# Patient Record
Sex: FEMALE | Race: WHITE | NOT HISPANIC OR LATINO | Employment: OTHER | URBAN - METROPOLITAN AREA
[De-identification: names, ages, dates, MRNs, and addresses within clinical notes are randomized per-mention and may not be internally consistent; named-entity substitution may affect disease eponyms.]

---

## 2024-08-14 ENCOUNTER — HOSPITAL ENCOUNTER (INPATIENT)
Facility: HOSPITAL | Age: 89
DRG: 177 | End: 2024-08-14
Attending: EMERGENCY MEDICINE | Admitting: HOSPITALIST
Payer: MEDICARE

## 2024-08-14 ENCOUNTER — APPOINTMENT (OUTPATIENT)
Dept: CARDIOLOGY | Facility: HOSPITAL | Age: 89
DRG: 871 | End: 2024-08-14
Payer: MEDICARE

## 2024-08-14 ENCOUNTER — APPOINTMENT (OUTPATIENT)
Dept: RADIOLOGY | Facility: HOSPITAL | Age: 89
DRG: 871 | End: 2024-08-14
Payer: MEDICARE

## 2024-08-14 DIAGNOSIS — S22.070A CLOSED WEDGE COMPRESSION FRACTURE OF T10 VERTEBRA, INITIAL ENCOUNTER (MULTI): ICD-10-CM

## 2024-08-14 DIAGNOSIS — A41.9 SEPSIS, DUE TO UNSPECIFIED ORGANISM, UNSPECIFIED WHETHER ACUTE ORGAN DYSFUNCTION PRESENT (MULTI): ICD-10-CM

## 2024-08-14 DIAGNOSIS — R09.02 HYPOXIA: ICD-10-CM

## 2024-08-14 DIAGNOSIS — J18.9 PNEUMONIA OF BOTH LUNGS DUE TO INFECTIOUS ORGANISM, UNSPECIFIED PART OF LUNG: ICD-10-CM

## 2024-08-14 DIAGNOSIS — S09.90XA HEAD INJURY, INITIAL ENCOUNTER: Primary | ICD-10-CM

## 2024-08-14 LAB
ALBUMIN SERPL BCP-MCNC: 3.4 G/DL (ref 3.4–5)
ALP SERPL-CCNC: 47 U/L (ref 33–136)
ALT SERPL W P-5'-P-CCNC: 10 U/L (ref 7–45)
ANION GAP SERPL CALC-SCNC: 12 MMOL/L (ref 10–20)
APTT PPP: 32 SECONDS (ref 27–38)
AST SERPL W P-5'-P-CCNC: 26 U/L (ref 9–39)
BASOPHILS # BLD AUTO: 0.02 X10*3/UL (ref 0–0.1)
BASOPHILS NFR BLD AUTO: 0.1 %
BILIRUB SERPL-MCNC: 0.6 MG/DL (ref 0–1.2)
BUN SERPL-MCNC: 23 MG/DL (ref 6–23)
CALCIUM SERPL-MCNC: 8.8 MG/DL (ref 8.6–10.3)
CARDIAC TROPONIN I PNL SERPL HS: 10 NG/L (ref 0–13)
CHLORIDE SERPL-SCNC: 103 MMOL/L (ref 98–107)
CO2 SERPL-SCNC: 25 MMOL/L (ref 21–32)
CREAT SERPL-MCNC: 0.91 MG/DL (ref 0.5–1.05)
EGFRCR SERPLBLD CKD-EPI 2021: 58 ML/MIN/1.73M*2
EOSINOPHIL # BLD AUTO: 0 X10*3/UL (ref 0–0.4)
EOSINOPHIL NFR BLD AUTO: 0 %
ERYTHROCYTE [DISTWIDTH] IN BLOOD BY AUTOMATED COUNT: 13.9 % (ref 11.5–14.5)
GLUCOSE SERPL-MCNC: 143 MG/DL (ref 74–99)
HCT VFR BLD AUTO: 33.2 % (ref 36–46)
HGB BLD-MCNC: 10.9 G/DL (ref 12–16)
IMM GRANULOCYTES # BLD AUTO: 0.65 X10*3/UL (ref 0–0.5)
IMM GRANULOCYTES NFR BLD AUTO: 3.1 % (ref 0–0.9)
INR PPP: 1.1 (ref 0.9–1.1)
LYMPHOCYTES # BLD AUTO: 1.02 X10*3/UL (ref 0.8–3)
LYMPHOCYTES NFR BLD AUTO: 4.8 %
MAGNESIUM SERPL-MCNC: 1.5 MG/DL (ref 1.6–2.4)
MCH RBC QN AUTO: 31.8 PG (ref 26–34)
MCHC RBC AUTO-ENTMCNC: 32.8 G/DL (ref 32–36)
MCV RBC AUTO: 97 FL (ref 80–100)
MONOCYTES # BLD AUTO: 1.08 X10*3/UL (ref 0.05–0.8)
MONOCYTES NFR BLD AUTO: 5.1 %
NEUTROPHILS # BLD AUTO: 18.33 X10*3/UL (ref 1.6–5.5)
NEUTROPHILS NFR BLD AUTO: 86.9 %
NRBC BLD-RTO: 0 /100 WBCS (ref 0–0)
PLATELET # BLD AUTO: 241 X10*3/UL (ref 150–450)
POTASSIUM SERPL-SCNC: 5.2 MMOL/L (ref 3.5–5.3)
PROT SERPL-MCNC: 6.8 G/DL (ref 6.4–8.2)
PROTHROMBIN TIME: 12.9 SECONDS (ref 9.8–12.8)
RBC # BLD AUTO: 3.43 X10*6/UL (ref 4–5.2)
SARS-COV-2 RNA RESP QL NAA+PROBE: NOT DETECTED
SODIUM SERPL-SCNC: 135 MMOL/L (ref 136–145)
WBC # BLD AUTO: 21.1 X10*3/UL (ref 4.4–11.3)

## 2024-08-14 PROCEDURE — 70450 CT HEAD/BRAIN W/O DYE: CPT | Performed by: RADIOLOGY

## 2024-08-14 PROCEDURE — 84484 ASSAY OF TROPONIN QUANT: CPT | Performed by: EMERGENCY MEDICINE

## 2024-08-14 PROCEDURE — 36415 COLL VENOUS BLD VENIPUNCTURE: CPT | Performed by: EMERGENCY MEDICINE

## 2024-08-14 PROCEDURE — 83880 ASSAY OF NATRIURETIC PEPTIDE: CPT | Performed by: EMERGENCY MEDICINE

## 2024-08-14 PROCEDURE — 80053 COMPREHEN METABOLIC PANEL: CPT | Performed by: EMERGENCY MEDICINE

## 2024-08-14 PROCEDURE — 85610 PROTHROMBIN TIME: CPT | Performed by: EMERGENCY MEDICINE

## 2024-08-14 PROCEDURE — 71250 CT THORAX DX C-: CPT | Performed by: RADIOLOGY

## 2024-08-14 PROCEDURE — 72128 CT CHEST SPINE W/O DYE: CPT | Performed by: RADIOLOGY

## 2024-08-14 PROCEDURE — 73502 X-RAY EXAM HIP UNI 2-3 VIEWS: CPT | Mod: LT

## 2024-08-14 PROCEDURE — 72128 CT CHEST SPINE W/O DYE: CPT | Mod: RCN

## 2024-08-14 PROCEDURE — 71045 X-RAY EXAM CHEST 1 VIEW: CPT | Performed by: RADIOLOGY

## 2024-08-14 PROCEDURE — 96367 TX/PROPH/DG ADDL SEQ IV INF: CPT

## 2024-08-14 PROCEDURE — 85025 COMPLETE CBC W/AUTO DIFF WBC: CPT | Performed by: EMERGENCY MEDICINE

## 2024-08-14 PROCEDURE — 70450 CT HEAD/BRAIN W/O DYE: CPT

## 2024-08-14 PROCEDURE — 85730 THROMBOPLASTIN TIME PARTIAL: CPT | Performed by: EMERGENCY MEDICINE

## 2024-08-14 PROCEDURE — 74176 CT ABD & PELVIS W/O CONTRAST: CPT

## 2024-08-14 PROCEDURE — 83735 ASSAY OF MAGNESIUM: CPT | Performed by: EMERGENCY MEDICINE

## 2024-08-14 PROCEDURE — 93005 ELECTROCARDIOGRAM TRACING: CPT

## 2024-08-14 PROCEDURE — 72131 CT LUMBAR SPINE W/O DYE: CPT | Performed by: RADIOLOGY

## 2024-08-14 PROCEDURE — 87635 SARS-COV-2 COVID-19 AMP PRB: CPT | Performed by: EMERGENCY MEDICINE

## 2024-08-14 PROCEDURE — 99285 EMERGENCY DEPT VISIT HI MDM: CPT | Mod: CS

## 2024-08-14 PROCEDURE — 74176 CT ABD & PELVIS W/O CONTRAST: CPT | Performed by: RADIOLOGY

## 2024-08-14 PROCEDURE — 72125 CT NECK SPINE W/O DYE: CPT | Performed by: RADIOLOGY

## 2024-08-14 PROCEDURE — 73502 X-RAY EXAM HIP UNI 2-3 VIEWS: CPT | Mod: LEFT SIDE | Performed by: RADIOLOGY

## 2024-08-14 PROCEDURE — 72125 CT NECK SPINE W/O DYE: CPT

## 2024-08-14 PROCEDURE — 72131 CT LUMBAR SPINE W/O DYE: CPT | Mod: RCN

## 2024-08-14 PROCEDURE — 71045 X-RAY EXAM CHEST 1 VIEW: CPT

## 2024-08-14 ASSESSMENT — PAIN SCALES - GENERAL: PAINLEVEL_OUTOF10: 8

## 2024-08-14 ASSESSMENT — COLUMBIA-SUICIDE SEVERITY RATING SCALE - C-SSRS
2. HAVE YOU ACTUALLY HAD ANY THOUGHTS OF KILLING YOURSELF?: NO
1. IN THE PAST MONTH, HAVE YOU WISHED YOU WERE DEAD OR WISHED YOU COULD GO TO SLEEP AND NOT WAKE UP?: NO
6. HAVE YOU EVER DONE ANYTHING, STARTED TO DO ANYTHING, OR PREPARED TO DO ANYTHING TO END YOUR LIFE?: NO

## 2024-08-14 ASSESSMENT — PAIN DESCRIPTION - PAIN TYPE: TYPE: ACUTE PAIN

## 2024-08-14 ASSESSMENT — PAIN DESCRIPTION - LOCATION: LOCATION: RIB CAGE

## 2024-08-14 ASSESSMENT — PAIN - FUNCTIONAL ASSESSMENT: PAIN_FUNCTIONAL_ASSESSMENT: 0-10

## 2024-08-15 ENCOUNTER — APPOINTMENT (OUTPATIENT)
Dept: CARDIOLOGY | Facility: HOSPITAL | Age: 89
DRG: 871 | End: 2024-08-15
Payer: MEDICARE

## 2024-08-15 PROBLEM — S09.90XA HEAD INJURY, INITIAL ENCOUNTER: Status: ACTIVE | Noted: 2024-08-15

## 2024-08-15 LAB
APPEARANCE UR: CLEAR
ATRIAL RATE: 70 BPM
BILIRUB UR STRIP.AUTO-MCNC: NEGATIVE MG/DL
BNP SERPL-MCNC: 128 PG/ML (ref 0–99)
CARDIAC TROPONIN I PNL SERPL HS: 10 NG/L (ref 0–13)
COLOR UR: ABNORMAL
GLUCOSE UR STRIP.AUTO-MCNC: NORMAL MG/DL
HOLD SPECIMEN: NORMAL
KETONES UR STRIP.AUTO-MCNC: NEGATIVE MG/DL
LEUKOCYTE ESTERASE UR QL STRIP.AUTO: ABNORMAL
MUCOUS THREADS #/AREA URNS AUTO: NORMAL /LPF
NITRITE UR QL STRIP.AUTO: NEGATIVE
P AXIS: 43 DEGREES
P OFFSET: 180 MS
P ONSET: 145 MS
PH UR STRIP.AUTO: 5.5 [PH]
PR INTERVAL: 154 MS
PROT UR STRIP.AUTO-MCNC: ABNORMAL MG/DL
Q ONSET: 222 MS
QRS COUNT: 11 BEATS
QRS DURATION: 74 MS
QT INTERVAL: 392 MS
QTC CALCULATION(BAZETT): 423 MS
QTC FREDERICIA: 412 MS
R AXIS: 58 DEGREES
RBC # UR STRIP.AUTO: NEGATIVE /UL
RBC #/AREA URNS AUTO: NORMAL /HPF
SP GR UR STRIP.AUTO: 1.03
T AXIS: 60 DEGREES
T OFFSET: 418 MS
UROBILINOGEN UR STRIP.AUTO-MCNC: NORMAL MG/DL
VENTRICULAR RATE: 70 BPM
WBC #/AREA URNS AUTO: NORMAL /HPF

## 2024-08-15 PROCEDURE — 2500000004 HC RX 250 GENERAL PHARMACY W/ HCPCS (ALT 636 FOR OP/ED): Performed by: EMERGENCY MEDICINE

## 2024-08-15 PROCEDURE — 2500000002 HC RX 250 W HCPCS SELF ADMINISTERED DRUGS (ALT 637 FOR MEDICARE OP, ALT 636 FOR OP/ED): Performed by: INTERNAL MEDICINE

## 2024-08-15 PROCEDURE — 99223 1ST HOSP IP/OBS HIGH 75: CPT | Performed by: INTERNAL MEDICINE

## 2024-08-15 PROCEDURE — 2500000001 HC RX 250 WO HCPCS SELF ADMINISTERED DRUGS (ALT 637 FOR MEDICARE OP): Performed by: INTERNAL MEDICINE

## 2024-08-15 PROCEDURE — 93005 ELECTROCARDIOGRAM TRACING: CPT

## 2024-08-15 PROCEDURE — 36415 COLL VENOUS BLD VENIPUNCTURE: CPT | Performed by: EMERGENCY MEDICINE

## 2024-08-15 PROCEDURE — 87086 URINE CULTURE/COLONY COUNT: CPT | Mod: AHULAB | Performed by: EMERGENCY MEDICINE

## 2024-08-15 PROCEDURE — 87040 BLOOD CULTURE FOR BACTERIA: CPT | Mod: AHULAB | Performed by: EMERGENCY MEDICINE

## 2024-08-15 PROCEDURE — 2500000004 HC RX 250 GENERAL PHARMACY W/ HCPCS (ALT 636 FOR OP/ED): Performed by: INTERNAL MEDICINE

## 2024-08-15 PROCEDURE — 1100000001 HC PRIVATE ROOM DAILY

## 2024-08-15 PROCEDURE — 96365 THER/PROPH/DIAG IV INF INIT: CPT | Mod: 59

## 2024-08-15 PROCEDURE — 84484 ASSAY OF TROPONIN QUANT: CPT | Performed by: EMERGENCY MEDICINE

## 2024-08-15 PROCEDURE — 81001 URINALYSIS AUTO W/SCOPE: CPT | Performed by: EMERGENCY MEDICINE

## 2024-08-15 RX ORDER — POTASSIUM CHLORIDE 750 MG/1
10 TABLET, FILM COATED, EXTENDED RELEASE ORAL 2 TIMES DAILY
COMMUNITY

## 2024-08-15 RX ORDER — SERTRALINE HYDROCHLORIDE 25 MG/1
25 TABLET, FILM COATED ORAL DAILY
COMMUNITY

## 2024-08-15 RX ORDER — HYDRALAZINE HYDROCHLORIDE 25 MG/1
25 TABLET, FILM COATED ORAL DAILY
Status: DISCONTINUED | OUTPATIENT
Start: 2024-08-15 | End: 2024-08-20 | Stop reason: HOSPADM

## 2024-08-15 RX ORDER — BENZONATATE 100 MG/1
100 CAPSULE ORAL 3 TIMES DAILY PRN
Status: DISCONTINUED | OUTPATIENT
Start: 2024-08-15 | End: 2024-08-20 | Stop reason: HOSPADM

## 2024-08-15 RX ORDER — CHOLECALCIFEROL (VITAMIN D3) 25 MCG
1000 TABLET ORAL DAILY
Status: DISCONTINUED | OUTPATIENT
Start: 2024-08-15 | End: 2024-08-20 | Stop reason: HOSPADM

## 2024-08-15 RX ORDER — ENOXAPARIN SODIUM 100 MG/ML
30 INJECTION SUBCUTANEOUS DAILY
Status: DISCONTINUED | OUTPATIENT
Start: 2024-08-15 | End: 2024-08-20 | Stop reason: HOSPADM

## 2024-08-15 RX ORDER — ACETAMINOPHEN 500 MG
2 TABLET ORAL EVERY 6 HOURS PRN
COMMUNITY
Start: 2024-03-25

## 2024-08-15 RX ORDER — CEFTRIAXONE 1 G/50ML
1 INJECTION, SOLUTION INTRAVENOUS ONCE
Status: COMPLETED | OUTPATIENT
Start: 2024-08-15 | End: 2024-08-15

## 2024-08-15 RX ORDER — CHOLECALCIFEROL (VITAMIN D3) 25 MCG
1000 TABLET ORAL DAILY
COMMUNITY

## 2024-08-15 RX ORDER — LOSARTAN POTASSIUM 100 MG/1
100 TABLET ORAL DAILY
COMMUNITY

## 2024-08-15 RX ORDER — LEVOFLOXACIN 5 MG/ML
750 INJECTION, SOLUTION INTRAVENOUS EVERY OTHER DAY
Status: DISCONTINUED | OUTPATIENT
Start: 2024-08-15 | End: 2024-08-19

## 2024-08-15 RX ORDER — HYDRALAZINE HYDROCHLORIDE 25 MG/1
25 TABLET, FILM COATED ORAL DAILY
COMMUNITY

## 2024-08-15 RX ORDER — POTASSIUM CHLORIDE 750 MG/1
10 TABLET, FILM COATED, EXTENDED RELEASE ORAL 2 TIMES DAILY
Status: DISCONTINUED | OUTPATIENT
Start: 2024-08-15 | End: 2024-08-20 | Stop reason: HOSPADM

## 2024-08-15 RX ORDER — LANOLIN ALCOHOL/MO/W.PET/CERES
400 CREAM (GRAM) TOPICAL DAILY
Status: DISCONTINUED | OUTPATIENT
Start: 2024-08-15 | End: 2024-08-20 | Stop reason: HOSPADM

## 2024-08-15 RX ORDER — PANTOPRAZOLE SODIUM 40 MG/1
40 TABLET, DELAYED RELEASE ORAL
Status: DISCONTINUED | OUTPATIENT
Start: 2024-08-15 | End: 2024-08-20 | Stop reason: HOSPADM

## 2024-08-15 RX ORDER — BISMUTH SUBSALICYLATE 262 MG
1 TABLET,CHEWABLE ORAL DAILY
COMMUNITY

## 2024-08-15 RX ORDER — SERTRALINE HYDROCHLORIDE 50 MG/1
25 TABLET, FILM COATED ORAL DAILY
Status: DISCONTINUED | OUTPATIENT
Start: 2024-08-15 | End: 2024-08-20 | Stop reason: HOSPADM

## 2024-08-15 RX ORDER — LOSARTAN POTASSIUM 50 MG/1
100 TABLET ORAL DAILY
Status: DISCONTINUED | OUTPATIENT
Start: 2024-08-15 | End: 2024-08-20 | Stop reason: HOSPADM

## 2024-08-15 RX ORDER — ACETAMINOPHEN 325 MG/1
650 TABLET ORAL EVERY 6 HOURS PRN
Status: DISCONTINUED | OUTPATIENT
Start: 2024-08-15 | End: 2024-08-20 | Stop reason: HOSPADM

## 2024-08-15 SDOH — SOCIAL STABILITY: SOCIAL INSECURITY: WERE YOU ABLE TO COMPLETE ALL THE BEHAVIORAL HEALTH SCREENINGS?: YES

## 2024-08-15 SDOH — SOCIAL STABILITY: SOCIAL INSECURITY: DO YOU FEEL ANYONE HAS EXPLOITED OR TAKEN ADVANTAGE OF YOU FINANCIALLY OR OF YOUR PERSONAL PROPERTY?: NO

## 2024-08-15 SDOH — SOCIAL STABILITY: SOCIAL INSECURITY: DOES ANYONE TRY TO KEEP YOU FROM HAVING/CONTACTING OTHER FRIENDS OR DOING THINGS OUTSIDE YOUR HOME?: NO

## 2024-08-15 SDOH — SOCIAL STABILITY: SOCIAL INSECURITY: ARE YOU OR HAVE YOU BEEN THREATENED OR ABUSED PHYSICALLY, EMOTIONALLY, OR SEXUALLY BY ANYONE?: NO

## 2024-08-15 SDOH — SOCIAL STABILITY: SOCIAL INSECURITY: ABUSE: ADULT

## 2024-08-15 SDOH — SOCIAL STABILITY: SOCIAL INSECURITY: ARE THERE ANY APPARENT SIGNS OF INJURIES/BEHAVIORS THAT COULD BE RELATED TO ABUSE/NEGLECT?: NO

## 2024-08-15 SDOH — SOCIAL STABILITY: SOCIAL INSECURITY: HAVE YOU HAD THOUGHTS OF HARMING ANYONE ELSE?: NO

## 2024-08-15 SDOH — SOCIAL STABILITY: SOCIAL INSECURITY: DO YOU FEEL UNSAFE GOING BACK TO THE PLACE WHERE YOU ARE LIVING?: NO

## 2024-08-15 SDOH — SOCIAL STABILITY: SOCIAL INSECURITY: HAVE YOU HAD ANY THOUGHTS OF HARMING ANYONE ELSE?: NO

## 2024-08-15 SDOH — SOCIAL STABILITY: SOCIAL INSECURITY: HAS ANYONE EVER THREATENED TO HURT YOUR FAMILY OR YOUR PETS?: NO

## 2024-08-15 ASSESSMENT — ACTIVITIES OF DAILY LIVING (ADL)
TOILETING: NEEDS ASSISTANCE
WALKS IN HOME: NEEDS ASSISTANCE
DRESSING YOURSELF: NEEDS ASSISTANCE
ASSISTIVE_DEVICE: WALKER
BATHING: NEEDS ASSISTANCE
FEEDING YOURSELF: NEEDS ASSISTANCE
GROOMING: NEEDS ASSISTANCE
PATIENT'S MEMORY ADEQUATE TO SAFELY COMPLETE DAILY ACTIVITIES?: YES
ADEQUATE_TO_COMPLETE_ADL: YES
LACK_OF_TRANSPORTATION: NO
HEARING - RIGHT EAR: DIFFICULTY WITH NOISE
HEARING - LEFT EAR: DIFFICULTY WITH NOISE

## 2024-08-15 ASSESSMENT — COGNITIVE AND FUNCTIONAL STATUS - GENERAL
STANDING UP FROM CHAIR USING ARMS: A LOT
WALKING IN HOSPITAL ROOM: A LOT
PATIENT BASELINE BEDBOUND: NO
DRESSING REGULAR LOWER BODY CLOTHING: A LOT
DRESSING REGULAR UPPER BODY CLOTHING: A LITTLE
MOVING FROM LYING ON BACK TO SITTING ON SIDE OF FLAT BED WITH BEDRAILS: A LITTLE
TOILETING: A LOT
HELP NEEDED FOR BATHING: A LOT
DAILY ACTIVITIY SCORE: 14
STANDING UP FROM CHAIR USING ARMS: A LOT
CLIMB 3 TO 5 STEPS WITH RAILING: TOTAL
MOVING TO AND FROM BED TO CHAIR: A LITTLE
WALKING IN HOSPITAL ROOM: A LOT
PERSONAL GROOMING: A LOT
MOBILITY SCORE: 14
CLIMB 3 TO 5 STEPS WITH RAILING: TOTAL
DRESSING REGULAR LOWER BODY CLOTHING: A LOT
EATING MEALS: A LITTLE
MOVING FROM LYING ON BACK TO SITTING ON SIDE OF FLAT BED WITH BEDRAILS: A LITTLE
TURNING FROM BACK TO SIDE WHILE IN FLAT BAD: A LITTLE
TURNING FROM BACK TO SIDE WHILE IN FLAT BAD: A LITTLE
MOVING TO AND FROM BED TO CHAIR: A LITTLE
PERSONAL GROOMING: A LOT
EATING MEALS: A LITTLE
HELP NEEDED FOR BATHING: A LOT
DAILY ACTIVITIY SCORE: 14
DRESSING REGULAR UPPER BODY CLOTHING: A LITTLE
MOBILITY SCORE: 14
TOILETING: A LOT

## 2024-08-15 ASSESSMENT — LIFESTYLE VARIABLES
SKIP TO QUESTIONS 9-10: 1
HOW OFTEN DO YOU HAVE A DRINK CONTAINING ALCOHOL: NEVER
AUDIT-C TOTAL SCORE: 0
HOW OFTEN DO YOU HAVE 6 OR MORE DRINKS ON ONE OCCASION: NEVER
HOW MANY STANDARD DRINKS CONTAINING ALCOHOL DO YOU HAVE ON A TYPICAL DAY: PATIENT DOES NOT DRINK
AUDIT-C TOTAL SCORE: 0

## 2024-08-15 ASSESSMENT — PAIN SCALES - GENERAL
PAINLEVEL_OUTOF10: 0 - NO PAIN
PAINLEVEL_OUTOF10: 5 - MODERATE PAIN
PAINLEVEL_OUTOF10: 0 - NO PAIN

## 2024-08-15 ASSESSMENT — PATIENT HEALTH QUESTIONNAIRE - PHQ9
1. LITTLE INTEREST OR PLEASURE IN DOING THINGS: NOT AT ALL
SUM OF ALL RESPONSES TO PHQ9 QUESTIONS 1 & 2: 0
2. FEELING DOWN, DEPRESSED OR HOPELESS: NOT AT ALL

## 2024-08-15 ASSESSMENT — PAIN DESCRIPTION - LOCATION: LOCATION: BACK

## 2024-08-15 ASSESSMENT — PAIN - FUNCTIONAL ASSESSMENT: PAIN_FUNCTIONAL_ASSESSMENT: 0-10

## 2024-08-15 NOTE — ED TRIAGE NOTES
Per EMS, patient confused, patient slid off the couch at her assisted living facility, called EMS for c/o difficulty breathing. Patient has chronic cough.   Patient c/o right sided rib pain and trouble taking a deep breath after falling. Patient states she did not hit her head or loose consciousness.

## 2024-08-15 NOTE — PROGRESS NOTES
[facility]  [facility] [phone]     08/15/24 0651   Current Planned Discharge Disposition   Current Planned Discharge Disposition Home

## 2024-08-15 NOTE — H&P
History Of Present Illness  Bev Christine is a 89 y.o. female presenting with cough and apparent head injury. She says she was sitting on the cough, coughing a lot and hit her head on the soft cough during a coughing fit. Says she did not suffer any head trauma. Denies LOC, dizziness. Says her main concern is coughing. Denies fever/chills, cp. Does have some exertional sob. No n/v/c/d. 10 point ros reviewed and otherwise negative.      Past Medical History  She has no past medical history on file.    Surgical History  She has no past surgical history on file.     Social History  She reports that she has never smoked. She has never used smokeless tobacco. No history on file for alcohol use and drug use.    Family History  No family history on file.     Allergies  Iodinated contrast media, Amlodipine, Lactose, and Penicillins    Review of Systems     Physical Exam  Constitutional:       Appearance: Normal appearance.   Cardiovascular:      Rate and Rhythm: Normal rate and regular rhythm.   Pulmonary:      Effort: Pulmonary effort is normal.      Breath sounds: Rhonchi present.   Abdominal:      General: Abdomen is flat. Bowel sounds are normal.      Palpations: Abdomen is soft.   Musculoskeletal:      Cervical back: Normal range of motion.   Skin:     General: Skin is warm.   Neurological:      General: No focal deficit present.      Mental Status: She is alert and oriented to person, place, and time. Mental status is at baseline.   Psychiatric:         Mood and Affect: Mood normal.         Behavior: Behavior normal.         Thought Content: Thought content normal.         Judgment: Judgment normal.          Last Recorded Vitals  /71   Pulse 78   Temp 36.8 °C (98.3 °F) (Oral)   Resp 19   Wt (!) 40.8 kg (90 lb)   SpO2 (!) 91%     Relevant Results           Assessment/Plan   Assessment & Plan  Head injury, initial encounter      8/15:  PNA... on 1L, cough is better... cont iv abx, o2, anti-tussives.   HTN,  elevated this morning... restart home meds with hydralazine, losartan.   Hx large hiatal hernia, ? Esophagitis or mass on imaging... she has no GERD symptoms right now... OP GI f/up.   Hx L neck cyst... cont OP monitoring.   Home regimen for chronic conditions  Dvt px with lovenox  Pt/ot  Pt lives in [facility]       Lucas Gallego MD

## 2024-08-15 NOTE — PROGRESS NOTES
Transitional Care Coordination Progress Note:  Plan per Medical/Surgical team: treatment of fall with head strike with IV ATB & IV fluids  Status: Inpatient   Payor source: medicare A/B  Discharge disposition: [facility] [phone]  Potential Barriers: c/o right sided rib pain and trouble taking a deep breath after falling- placed on oxygen  ADOD: 8/17/2024   ZULEYMA Winslow RN, BSN Transitional Care Coordinator ED# [phone]      08/15/24 0653   Discharge Planning   Living Arrangements Alone   Support Systems Children   Assistance Needed neuro assessment & observation due to head strike   Type of Residence Assisted living   Do you have animals or pets at home? No   Care Facility Name [facility] [phone]   Home or Post Acute Services Post acute facilities (Rehab/SNF/etc)   Type of Post Acute Facility Services Assisted living   Expected Discharge Disposition [facility]   Does the patient need discharge transport arranged? Yes   RoundTrip coordination needed? Yes   Has discharge transport been arranged? No   Financial Resource Strain   How hard is it for you to pay for the very basics like food, housing, medical care, and heating? Not hard   Housing Stability   In the last 12 months, was there a time when you were not able to pay the mortgage or rent on time? N   In the past 12 months, how many times have you moved where you were living? 1   At any time in the past 12 months, were you homeless or living in a shelter (including now)? N   Transportation Needs   In the past 12 months, has lack of transportation kept you from medical appointments or from getting medications? no   In the past 12 months, has lack of transportation kept you from meetings, work, or from getting things needed for daily living? No

## 2024-08-15 NOTE — PROGRESS NOTES
08/15/24 0651   [facility] Disability Status   Are you deaf or do you have serious difficulty hearing? N   Are you blind or do you have serious difficulty seeing, even when wearing glasses? N   Because of a physical, mental, or emotional condition, do you have serious difficulty concentrating, remembering, or making decisions? (5 years old or older) Y   Do you have serious difficulty walking or climbing stairs? Y  (walker, fall)   Do you have serious difficulty dressing or bathing? N   Because of a physical, mental, or emotional condition, do you have serious difficulty doing errands alone such as visiting the doctor? Y

## 2024-08-15 NOTE — PROGRESS NOTES
Pharmacy Medication History Review   Spoke to the patient, added all the medications the pt takes.  All matched the pharmacy    Bev Christine is a 89 y.o. female admitted for Head injury, initial encounter. Pharmacy reviewed the patient's [id number] medications and allergies for accuracy.    The list below reflectives the updated PTA list. Please review each medication in order reconciliation for additional clarification and justification.    The following updates were made to the Prior to Admission medication list:     Source of Information:     Medications ADDED:   Hydralazine  Losartan  Potassium Chl  Zoloft  Medications CHANGED:    Medications REMOVED:     Medications NOT TAKING:       Allergy reviewed : Yes    Comments:     Prior to Admission Medications   Prescriptions Last Dose Informant   acetaminophen (Tylenol) 500 mg tablet 8/14/2024 at hyd    Sig: Take 2 tablets (1,000 mg) by mouth every 6 hours if needed for moderate pain (4 - 6).   cholecalciferol (Vitamin D-3) 25 MCG (1000 UT) tablet 8/14/2024    Sig: Take 1 tablet (1,000 Units) by mouth once daily.   hydrALAZINE (Apresoline) 25 mg tablet 8/14/2024 at am    Sig: Take 1 tablet (25 mg) by mouth once daily.   losartan (Cozaar) 100 mg tablet 8/14/2024 at am    Sig: Take 1 tablet (100 mg) by mouth once daily.   multivitamin tablet 8/14/2024    Sig: Take 1 tablet by mouth once daily.   potassium chloride CR 10 mEq ER tablet 8/14/2024 at am    Sig: Take 1 tablet (10 mEq) by mouth 2 times a day. Do not crush, chew, or split.   sertraline (Zoloft) 25 mg tablet 8/14/2024 at am    Sig: Take 1 tablet (25 mg) by mouth once daily.      Facility-Administered Medications: None       The list below reflectives the updated allergy list. Please review each documented allergy for additional clarification and justification.  Allergies  Reviewed by Mulu Parisi CPhT on 8/15/2024        Severity Reactions Comments    Iodinated Contrast Media Medium Unknown Face  flush    Amlodipine Not Specified Swelling LE edema    Lactose Not Specified GI Upset     Penicillins Low Rash             Below are additional concerns with the patient's PTA list.      Mulu Parisi CPhT

## 2024-08-15 NOTE — ED PROVIDER NOTES
HPI   Chief Complaint   Patient presents with    Fall    Shortness of Breath       HPI  Patient presents with increased cough this week and shortness of breath.  She let go of her walker and fell.  I witnessed a video where she falls hit her head on the corner of a chair in her back and her bottom on the ground.      Patient History   History reviewed. No pertinent past medical history.  History reviewed. No pertinent surgical history.  No family history on file.  Social History     Tobacco Use    Smoking status: Never    Smokeless tobacco: Never   Substance Use Topics    Alcohol use: Not on file    Drug use: Not on file       Physical Exam   ED Triage Vitals [08/14/24 2031]   Temperature Heart Rate Respirations BP   36.8 °C (98.3 °F) 83 20 148/79      Pulse Ox Temp Source Heart Rate Source Patient Position   96 % Oral Monitor Lying      BP Location FiO2 (%)     Right arm --       Physical Exam  Vitals and nursing note reviewed.   Constitutional:       General: She is not in acute distress.     Appearance: She is well-developed.   HENT:      Head: Normocephalic and atraumatic.      Comments: No large external contusion seen but I witnessed a video where she hit her head today.  Eyes:      Conjunctiva/sclera: Conjunctivae normal.   Cardiovascular:      Rate and Rhythm: Normal rate and regular rhythm.      Heart sounds: No murmur heard.  Pulmonary:      Effort: Pulmonary effort is normal. No respiratory distress.      Breath sounds: Normal breath sounds.   Abdominal:      Palpations: Abdomen is soft.      Tenderness: There is no abdominal tenderness.   Musculoskeletal:         General: No swelling.      Cervical back: Neck supple.   Skin:     General: Skin is warm and dry.      Capillary Refill: Capillary refill takes less than 2 seconds.   Neurological:      Mental Status: She is alert.   Psychiatric:         Mood and Affect: Mood normal.           ED Course & MDM   Diagnoses as of 08/16/24 0029   Head injury,  initial encounter   Closed wedge compression fracture of T10 vertebra, initial encounter (Multi)   Pneumonia of both lungs due to infectious organism, unspecified part of lung   Sepsis, due to unspecified organism, unspecified whether acute organ dysfunction present (Multi)   Hypoxia                 No data recorded     Mick Coma Scale Score: 14 (08/14/24 2034 : Fang Benitez RN)                           Medical Decision Making  Given video end of visit head trauma will CT the head today.  Patient complains of acute on chronic back pain.  She has severe kyphosis of her back.  Will CT this back today.  No anterior abdominal tenderness.  However given that we are scanning the spine I did add on chest abdomen pelvis as well.  Patient did have some complaint of left hip pain.  Pelvis CT was ordered.  The patient was found to have pneumonia with the hypoxia and elevated white blood cell count.  Patient has a new compression fracture of T10.  No intracranial process seen.  Admit to the hospitalist at this time.    EKG interpreted by myself.  Normal sinus rhythm at a rate of 70 bpm.  Normal intervals.  Normal axis.  No signs of acute ischemia.    Procedure  Procedures     Som Khan MD  08/16/24 9105

## 2024-08-16 LAB
BACTERIA UR CULT: NORMAL
GLUCOSE BLD MANUAL STRIP-MCNC: 91 MG/DL (ref 74–99)

## 2024-08-16 PROCEDURE — 2500000001 HC RX 250 WO HCPCS SELF ADMINISTERED DRUGS (ALT 637 FOR MEDICARE OP): Performed by: INTERNAL MEDICINE

## 2024-08-16 PROCEDURE — 1100000001 HC PRIVATE ROOM DAILY

## 2024-08-16 PROCEDURE — 2500000004 HC RX 250 GENERAL PHARMACY W/ HCPCS (ALT 636 FOR OP/ED): Performed by: EMERGENCY MEDICINE

## 2024-08-16 PROCEDURE — 99233 SBSQ HOSP IP/OBS HIGH 50: CPT | Performed by: INTERNAL MEDICINE

## 2024-08-16 PROCEDURE — 2500000004 HC RX 250 GENERAL PHARMACY W/ HCPCS (ALT 636 FOR OP/ED): Performed by: INTERNAL MEDICINE

## 2024-08-16 PROCEDURE — 82947 ASSAY GLUCOSE BLOOD QUANT: CPT

## 2024-08-16 PROCEDURE — 97165 OT EVAL LOW COMPLEX 30 MIN: CPT | Mod: GO

## 2024-08-16 PROCEDURE — 97161 PT EVAL LOW COMPLEX 20 MIN: CPT | Mod: GP

## 2024-08-16 PROCEDURE — 2500000002 HC RX 250 W HCPCS SELF ADMINISTERED DRUGS (ALT 637 FOR MEDICARE OP, ALT 636 FOR OP/ED): Performed by: INTERNAL MEDICINE

## 2024-08-16 RX ORDER — DOCUSATE SODIUM 100 MG/1
100 CAPSULE, LIQUID FILLED ORAL 2 TIMES DAILY
Status: DISCONTINUED | OUTPATIENT
Start: 2024-08-16 | End: 2024-08-20 | Stop reason: HOSPADM

## 2024-08-16 ASSESSMENT — COGNITIVE AND FUNCTIONAL STATUS - GENERAL
HELP NEEDED FOR BATHING: A LITTLE
STANDING UP FROM CHAIR USING ARMS: A LITTLE
MOVING FROM LYING ON BACK TO SITTING ON SIDE OF FLAT BED WITH BEDRAILS: A LITTLE
DAILY ACTIVITIY SCORE: 18
MOBILITY SCORE: 17
MOBILITY SCORE: 19
TOILETING: A LITTLE
MOVING TO AND FROM BED TO CHAIR: A LITTLE
TOILETING: A LITTLE
DRESSING REGULAR UPPER BODY CLOTHING: A LITTLE
CLIMB 3 TO 5 STEPS WITH RAILING: A LOT
WALKING IN HOSPITAL ROOM: A LITTLE
TURNING FROM BACK TO SIDE WHILE IN FLAT BAD: A LITTLE
CLIMB 3 TO 5 STEPS WITH RAILING: A LOT
STANDING UP FROM CHAIR USING ARMS: A LITTLE
DAILY ACTIVITIY SCORE: 19
DAILY ACTIVITIY SCORE: 19
DRESSING REGULAR LOWER BODY CLOTHING: A LITTLE
DRESSING REGULAR UPPER BODY CLOTHING: A LITTLE
MOVING TO AND FROM BED TO CHAIR: A LITTLE
TOILETING: A LITTLE
MOBILITY SCORE: 17
MOVING TO AND FROM BED TO CHAIR: A LITTLE
STANDING UP FROM CHAIR USING ARMS: A LITTLE
EATING MEALS: A LITTLE
DRESSING REGULAR LOWER BODY CLOTHING: A LITTLE
TURNING FROM BACK TO SIDE WHILE IN FLAT BAD: A LITTLE
MOVING FROM LYING ON BACK TO SITTING ON SIDE OF FLAT BED WITH BEDRAILS: A LITTLE
HELP NEEDED FOR BATHING: A LITTLE
CLIMB 3 TO 5 STEPS WITH RAILING: A LOT
DRESSING REGULAR UPPER BODY CLOTHING: A LITTLE
PERSONAL GROOMING: A LITTLE
PERSONAL GROOMING: A LITTLE
HELP NEEDED FOR BATHING: A LITTLE
WALKING IN HOSPITAL ROOM: A LITTLE
PERSONAL GROOMING: A LITTLE
DRESSING REGULAR LOWER BODY CLOTHING: A LITTLE
WALKING IN HOSPITAL ROOM: A LITTLE

## 2024-08-16 ASSESSMENT — PAIN - FUNCTIONAL ASSESSMENT
PAIN_FUNCTIONAL_ASSESSMENT: 0-10

## 2024-08-16 ASSESSMENT — PAIN SCALES - GENERAL
PAINLEVEL_OUTOF10: 0 - NO PAIN
PAINLEVEL_OUTOF10: 8
PAINLEVEL_OUTOF10: 5 - MODERATE PAIN
PAINLEVEL_OUTOF10: 0 - NO PAIN
PAINLEVEL_OUTOF10: 5 - MODERATE PAIN
PAINLEVEL_OUTOF10: 0 - NO PAIN
PAINLEVEL_OUTOF10: 0 - NO PAIN

## 2024-08-16 ASSESSMENT — ACTIVITIES OF DAILY LIVING (ADL)
ADL_ASSISTANCE: INDEPENDENT
ADL_ASSISTANCE: NEEDS ASSISTANCE

## 2024-08-16 NOTE — PROGRESS NOTES
08/16/24 1603   Discharge Planning   Assistance Needed TCC spoke to Nurse on duty; [facility] [phone] no barriers to return; call facility number to give nursing report; facility updated patient's oxygen currently weaning-to room air   Type of Residence Assisted living  ([phone])   Expected Discharge Disposition [facility]   Does the patient need discharge transport arranged? No  (daughter will provide transport back to facility)

## 2024-08-16 NOTE — PROGRESS NOTES
Physical Therapy    Physical Therapy Evaluation    Patient Name: Bev Christine  MRN: [id number]  Today's Date: 8/16/2024   Time Calculation  Start Time: 0845  Stop Time: 0906  Time Calculation (min): 21 min    Assessment/Plan   PT Assessment  PT Assessment Results: Decreased strength, Decreased range of motion, Decreased endurance, Impaired balance, Decreased mobility, Pain, Decreased safety awareness, Impaired judgement  Rehab Prognosis: Good  End of Session Communication: Bedside nurse  Assessment Comment: PT Evaluation Completed. The patient presented with decreased mobility, balance, endurance, safety awareness, and increased pain and fatigue. These impairments are negatively impacting her ability to perform at baseline functional level, in home environment. The patient is at risk of falls & injury. This patient would benefit from skilled therapy intervention to address limitations and progress towards the PT goals.  Anticipate low frequency PT needs at discharge  End of Session Patient Position: Bed, 3 rail up, Alarm on  IP OR SWING BED PT PLAN  Inpatient or Swing Bed: Inpatient  PT Plan  Treatment/Interventions: Bed mobility, Transfer training, Gait training, Balance training, Strengthening, Endurance training, Range of motion, Therapeutic exercise, Therapeutic activity, Home exercise program  PT Plan: Ongoing PT  PT Frequency: 3 times per week  PT Discharge Recommendations: Low intensity level of continued care  PT Recommended Transfer Status: Assist x1  PT - OK to Discharge: Yes (PT POC established)      Subjective   General Visit Information:  General  Reason for Referral: To ED s/p fall. Also reporting SOB.  Referred By: Lucas Gallego MD  Past Medical History Relevant to Rehab: History reviewed. No pertinent past medical history.    Prior to Session Communication: Bedside nurse  Patient Position Received: Bed, 3 rail up, Alarm on  General Comment: Pt pleasant and agreeable to PT eval. Pt's daughter  present.  Home Living:  Home Living  Type of Home: Assisted living  Lives With: Alone  Home Adaptive Equipment:  (rollator)  Home Layout: One level  Home Access: Level entry  Prior Level of Function:  Prior Function Per Pt/Caregiver Report  Level of [place]: Independent with ADLs and functional transfers, Needs assistance with homemaking  ADL Assistance: Independent  Homemaking Assistance: Needs assistance  Ambulatory Assistance: Independent (using rollator a majority of time)  Prior Function Comments: Does not drive. Denies any additional falls.  Precautions:  Precautions  Medical Precautions: Fall precautions    Objective   Pain:  Pain Assessment  Pain Assessment: 0-10  0-10 (Numeric) Pain Score: 0 - No pain  Cognition:  Cognition  Overall Cognitive Status: Within Functional Limits (mild confusion)  Orientation Level: Oriented X4  Memory: Exceptions to WFL  Insight: Mild    General Assessments:  Activity Tolerance  Endurance: Tolerates 10 - 20 min exercise with multiple rests    Sensation  Light Touch: No apparent deficits         Postural Control  Postural Control: Within Functional Limits    Static Sitting Balance  Static Sitting-Balance Support: Feet supported, Bilateral upper extremity supported  Static Sitting-Level of Assistance: Close supervision  Dynamic Sitting Balance  Dynamic Sitting-Balance Support: Feet supported, Bilateral upper extremity supported  Dynamic Sitting-Level of Assistance: Close supervision    Static Standing Balance  Static Standing-Balance Support: Bilateral upper extremity supported  Static Standing-Level of Assistance: Contact guard  Dynamic Standing Balance  Dynamic Standing-Balance Support: Bilateral upper extremity supported  Dynamic Standing-Level of Assistance: Contact guard  Functional Assessments:  Bed Mobility  Bed Mobility: Yes  Bed Mobility 1  Bed Mobility 1: Supine to sitting  Level of Assistance 1: Close supervision  Bed Mobility Comments 1: use of bed rails. HOB  minimally elevated however pt has adjustable bed at home.    Transfers  Transfer: Yes  Transfer 1  Technique 1: Sit to stand, Stand to sit  Transfer Device 1: Walker  Transfer Level of Assistance 1: Contact guard  Trials/Comments 1: x2 trials performed. Retro leaning with first stand. Cues for hand placement and to back up fully prior to sitting.    Ambulation/Gait Training  Ambulation/Gait Training Performed: Yes  Ambulation/Gait Training 1  Surface 1: Level tile  Device 1: Rolling walker  Assistance 1: Contact guard  Comments/Distance (ft) 1: 40 ft. Pt ambulates with decreased amairani and step length. Mildly forward flexed posture. Cues for walker placement and sequencing.  Extremity/Trunk Assessments:  RUE   RUE :  (shoulder flexion ROM limited to ~75 degrees. WFL distally.)  LUE   LUE:  (shoulder flexion ROM limited to ~75 degrees. WFL distally.)  RLE   RLE :  (Strength >3/5 based on observation of functional mobility. ROM WFL.)  LLE   LLE :  (Strength >3/5 based on observation of functional mobility. Pain with L knee ext. ROM WFL.)  Outcome Measures:  [facility] Basic Mobility  Turning from your back to your side while in a flat bed without using bedrails: A little  Moving from lying on your back to sitting on the side of a flat bed without using bedrails: A little  Moving to and from bed to chair (including a wheelchair): A little  Standing up from a chair using your arms (e.g. wheelchair or bedside chair): A little  To walk in hospital room: A little  Climbing 3-5 steps with railing: A lot  Basic Mobility - Total Score: 17    Encounter Problems       Encounter Problems (Active)       Balance       complete all mobility with normal balance while dual tasking, negotiating in a dynamic environment, carrying items, etc., with proactive and reactive static and dynamic standing and sitting tasks, with mod I with a RW >15 minutes.       Start:  08/16/24    Expected End:  08/30/24               Mobility       STG -  Patient will ambulate 150 ft mod I with a RW       Start:  08/16/24    Expected End:  08/30/24               PT Transfers       STG - Patient will perform bed mobility independently.        Start:  08/16/24    Expected End:  08/30/24            STG - Patient will transfer sit to and from stand mod I with a RW       Start:  08/16/24    Expected End:  08/30/24               Pain - Adult              Education Documentation  Body Mechanics, taught by Puja Schulz, PT at 8/16/2024 10:40 AM.  Learner: Patient  Readiness: Acceptance  Method: Explanation  Response: Verbalizes Understanding    Mobility Training, taught by Puja Schulz, PT at 8/16/2024 10:40 AM.  Learner: Patient  Readiness: Acceptance  Method: Explanation  Response: Verbalizes Understanding    Education Comments  No comments found.

## 2024-08-16 NOTE — PROGRESS NOTES
Occupational Therapy    Evaluation    Patient Name: Bev Christine  MRN: [id number]  Today's Date: 8/16/2024  Time Calculation  Start Time: 1057  Stop Time: 1116  Time Calculation (min): 19 min        Assessment:  OT Assessment:  (Pt admitted s/p fall off couch with SOB and cough. Currently on 1.5L O2 throughout session. Pt admitted from [facility], anticipate pt progress quickly to return to baseline function. Recommend Low intensity therapies.)  Prognosis: Good  Barriers to Discharge: None  Evaluation/Treatment Tolerance: Patient tolerated treatment well  Medical Staff Made Aware: Yes  End of Session Communication: Bedside nurse  End of Session Patient Position: Bed, 3 rail up, Alarm on  OT Assessment Results: Decreased ADL status, Decreased upper extremity range of motion, Decreased safe judgment during ADL, Decreased cognition, Decreased functional mobility, Decreased IADLs  Prognosis: Good  Barriers to Discharge: None  Evaluation/Treatment Tolerance: Patient tolerated treatment well  Medical Staff Made Aware: Yes  Strengths: Attitude of self  Plan:  Treatment Interventions: ADL retraining, Functional transfer training, UE strengthening/ROM  OT Frequency: 2 times per week  OT Discharge Recommendations: Low intensity level of continued care  OT - OK to Discharge: Yes  Treatment Interventions: ADL retraining, Functional transfer training, UE strengthening/ROM    Subjective   Current Problem:  1. Head injury, initial encounter        2. Closed wedge compression fracture of T10 vertebra, initial encounter (Multi)        3. Pneumonia of both lungs due to infectious organism, unspecified part of lung        4. Sepsis, due to unspecified organism, unspecified whether acute organ dysfunction present (Multi)        5. Hypoxia          General:  General  Reason for Referral: s/p fall off couch, reports cough and SOB  Referred By: Julián  Past Medical History Relevant to Rehab: History reviewed. No pertinent past medical  history.    Prior to Session Communication: Bedside nurse  Patient Position Received: Up in chair, Alarm on  General Comment: pt up in chair upon entry, agreeable to therapy  Precautions:  Medical Precautions: Fall precautions       Pain:  Pain Assessment  Pain Assessment: 0-10  0-10 (Numeric) Pain Score: 5 - Moderate pain  Pain Location: Back    Objective   Cognition:  Overall Cognitive Status: Within Functional Limits  Orientation Level: Oriented X4  Attention: Within Functional Limits  Memory: Exceptions to WFL  Safety/Judgement: Exceptions to WFL  Insight: Mild  Impulsive: Within functional limits           Home Living:  Type of Home: Assisted living  Lives With: Alone  Home Adaptive Equipment:  (rollator)  Home Layout: One level  Home Access: Level entry  Prior Function:  Level of [place]: Needs assistance with ADLs, Needs assistance with homemaking  Receives Help From: Family (AL staff)  ADL Assistance: Needs assistance (pt reports needing assistance with bathing, dressing, and toileting after bowel movement)  Homemaking Assistance: Needs assistance  Ambulatory Assistance: Independent (with rollator)  Prior Function Comments: Does not drive. Denies any additional falls.  IADL History:  Homemaking Responsibilities: No  ADL:  Grooming Assistance: Stand by  Grooming Deficit: Supervision/safety, Wash/dry hands  Toileting Assistance with Device: Stand by  Toileting Deficit: Supervison/safety, Increased time to complete, Grab bar use  Activity Tolerance:     Bed Mobility/Transfers:      Transfers  Transfer: Yes  Transfer 1  Technique 1: Sit to stand, Stand to sit  Transfer Device 1: Walker  Transfer Level of Assistance 1: Contact guard  Trials/Comments 1: from chair  Transfers 2  Technique 2: Sit to stand, Stand to sit  Transfer Device 2: Walker  Transfer Level of Assistance 2: Contact guard  Trials/Comments 2:  (from toilet)      Functional Mobility:  Functional Mobility  Functional Mobility Performed:  Yes  Functional Mobility 1  Surface 1: Level tile  Device 1: Rolling walker  Assistance 1: Contact guard  Comments 1:  (mobility to/from restroom)  Sitting Balance:  Static Sitting Balance  Static Sitting-Balance Support: Feet supported  Static Sitting-Level of Assistance: Distant supervision  Dynamic Sitting Balance  Dynamic Sitting-Balance Support: Feet supported  Dynamic Sitting-Level of Assistance: Distant supervision  Dynamic Sitting-Balance: Lateral lean, Forward lean  Standing Balance:  Static Standing Balance  Static Standing-Balance Support: Bilateral upper extremity supported  Static Standing-Level of Assistance: Close supervision  Dynamic Standing Balance  Dynamic Standing-Balance Support: No upper extremity supported  Dynamic Standing-Level of Assistance: Close supervision  Dynamic Standing-Balance:  (pulling pants up around waist and washing hands at sink)  Dynamic Standing-Comments: no LOB noted        Vision:Vision - Basic Assessment  Current Vision: Wears glasses all the time (does not have glasses in room)  Sensation:  Light Touch: No apparent deficits  Strength:  Strength Comments: grossly WFL       Extremities: RUE   RUE :  (shoulder flexion ~80 degrees, elbow distally WFL) and LUE   LUE:  (shoulder flexion ~80 degrees, elbow distally WFL)      Outcome Measures:[facility] Daily Activity  Putting on and taking off regular lower body clothing: A little  Bathing (including washing, rinsing, drying): A little  Putting on and taking off regular upper body clothing: A little  Toileting, which includes using toilet, bedpan or urinal: A little  Taking care of personal grooming such as brushing teeth: A little  Eating Meals: None  Daily Activity - Total Score: 19        Education Documentation  Body Mechanics, taught by Trixie Davis OT at 8/16/2024 12:00 PM.  Learner: Patient  Readiness: Acceptance  Method: Explanation  Response: Verbalizes Understanding, Needs Reinforcement    ADL Training, taught by  Trixie Davis, OT at 8/16/2024 12:00 PM.  Learner: Patient  Readiness: Acceptance  Method: Explanation  Response: Verbalizes Understanding, Needs Reinforcement    Education Comments  No comments found.        OP EDUCATION:       Goals:  Encounter Problems       Encounter Problems (Active)       ADLs       Patient will perform UB and LB bathing with stand by assist level of assistance and shower chair.       Start:  08/16/24    Expected End:  08/30/24            Patient with complete upper body dressing with stand by assist level of assistance donning and doffing all UE clothes with no adaptive equipment while edge of bed        Start:  08/16/24    Expected End:  08/30/24            Patient with complete lower body dressing with stand by assist level of assistance donning and doffing all LE clothes  with no adaptive equipment while edge of bed        Start:  08/16/24    Expected End:  08/30/24            Patient will complete daily grooming tasks brushing teeth and washing face/hair with supervision level of assistance and PRN adaptive equipment while standing.       Start:  08/16/24    Expected End:  08/30/24            Patient will complete toileting including hygiene clothing management/hygiene with stand by assist level of assistance and grab bars.       Start:  08/16/24    Expected End:  08/30/24               MOBILITY       Patient will perform Functional mobility min Household distances/Community Distances with supervision level of assistance and least restrictive device in order to improve safety and functional mobility.       Start:  08/16/24    Expected End:  08/30/24

## 2024-08-16 NOTE — PROGRESS NOTES
Bev Christine is a 89 y.o. female on day 1 of admission presenting with Head injury, initial encounter.      Subjective   On 2L, she feels much better. Afebrile. No overnight events reported. DTR bedside.        Objective     Last Recorded Vitals  /54 (BP Location: Left arm, Patient Position: Lying)   Pulse 78   Temp 36.4 °C (97.5 °F) (Temporal)   Resp 16   Wt (!) 36.3 kg (80 lb)   SpO2 98%   Intake/Output last 3 Shifts:    Intake/Output Summary (Last 24 hours) at 8/16/2024 1104  Last data filed at 8/16/2024 0925  Gross per 24 hour   Intake 240 ml   Output 600 ml   Net -360 ml       Admission Weight  Weight: (!) 40.8 kg (90 lb) (08/14/24 2031)    Daily Weight  08/15/24 : (!) 36.3 kg (80 lb)    Image Results  ECG 12 lead  Normal sinus rhythm  Normal ECG  When compared with ECG of 18-JUL-2023 12:06,  Previous ECG has undetermined rhythm, needs review  See ED provider note for full interpretation and clinical correlation  Confirmed by Kimmy Banerjee ([id number]) on 8/15/2024 12:00:49 PM      Physical Exam  Constitutional:       Appearance: Normal appearance.   Cardiovascular:      Rate and Rhythm: Normal rate and regular rhythm.   Pulmonary:      Effort: Pulmonary effort is normal.      Breath sounds: Rhonchi present.   Abdominal:      General: Abdomen is flat. Bowel sounds are normal.      Palpations: Abdomen is soft.   Musculoskeletal:      Cervical back: Normal range of motion.   Skin:     General: Skin is warm.   Neurological:      General: No focal deficit present.      Mental Status: She is alert and oriented to person, place, and time. Mental status is at baseline.   Psychiatric:         Mood and Affect: Mood normal.         Behavior: Behavior normal.         Thought Content: Thought content normal.         Judgment: Judgment normal.         Relevant Results               Assessment/Plan          Assessment & Plan  Head injury, initial encounter    8/16:  PNA... cont iv abx, wean to room air.    Deconditioning, fall at [facility]... pt/ot.     Likely dc back to [facility] in 1-2 days.   Updated DTR who was bedside.         8/15:  PNA... on 1L, cough is better... cont iv abx, o2, anti-tussives.   HTN, elevated this morning... restart home meds with hydralazine, losartan.   Hx large hiatal hernia, ? Esophagitis or mass on imaging... she has no GERD symptoms right now... OP GI f/up.   Hx L neck cyst... cont OP monitoring.   Home regimen for chronic conditions  Dvt px with lovenox  Pt/ot  Pt lives in assisted         Lucas Gallego MD

## 2024-08-17 LAB
ANION GAP SERPL CALC-SCNC: 11 MMOL/L (ref 10–20)
BUN SERPL-MCNC: 14 MG/DL (ref 6–23)
CALCIUM SERPL-MCNC: 8.8 MG/DL (ref 8.6–10.3)
CHLORIDE SERPL-SCNC: 103 MMOL/L (ref 98–107)
CO2 SERPL-SCNC: 25 MMOL/L (ref 21–32)
CREAT SERPL-MCNC: 0.66 MG/DL (ref 0.5–1.05)
EGFRCR SERPLBLD CKD-EPI 2021: 81 ML/MIN/1.73M*2
ERYTHROCYTE [DISTWIDTH] IN BLOOD BY AUTOMATED COUNT: 13.5 % (ref 11.5–14.5)
GLUCOSE SERPL-MCNC: 87 MG/DL (ref 74–99)
HCT VFR BLD AUTO: 34.5 % (ref 36–46)
HGB BLD-MCNC: 11 G/DL (ref 12–16)
MCH RBC QN AUTO: 31.5 PG (ref 26–34)
MCHC RBC AUTO-ENTMCNC: 31.9 G/DL (ref 32–36)
MCV RBC AUTO: 99 FL (ref 80–100)
NRBC BLD-RTO: 0 /100 WBCS (ref 0–0)
PLATELET # BLD AUTO: 201 X10*3/UL (ref 150–450)
POTASSIUM SERPL-SCNC: 4 MMOL/L (ref 3.5–5.3)
RBC # BLD AUTO: 3.49 X10*6/UL (ref 4–5.2)
SODIUM SERPL-SCNC: 135 MMOL/L (ref 136–145)
WBC # BLD AUTO: 9.4 X10*3/UL (ref 4.4–11.3)

## 2024-08-17 PROCEDURE — 2500000004 HC RX 250 GENERAL PHARMACY W/ HCPCS (ALT 636 FOR OP/ED): Performed by: INTERNAL MEDICINE

## 2024-08-17 PROCEDURE — 80048 BASIC METABOLIC PNL TOTAL CA: CPT | Performed by: INTERNAL MEDICINE

## 2024-08-17 PROCEDURE — 2500000004 HC RX 250 GENERAL PHARMACY W/ HCPCS (ALT 636 FOR OP/ED): Performed by: EMERGENCY MEDICINE

## 2024-08-17 PROCEDURE — 2500000002 HC RX 250 W HCPCS SELF ADMINISTERED DRUGS (ALT 637 FOR MEDICARE OP, ALT 636 FOR OP/ED): Performed by: INTERNAL MEDICINE

## 2024-08-17 PROCEDURE — 36415 COLL VENOUS BLD VENIPUNCTURE: CPT | Performed by: INTERNAL MEDICINE

## 2024-08-17 PROCEDURE — 85027 COMPLETE CBC AUTOMATED: CPT | Performed by: INTERNAL MEDICINE

## 2024-08-17 PROCEDURE — 2500000001 HC RX 250 WO HCPCS SELF ADMINISTERED DRUGS (ALT 637 FOR MEDICARE OP): Performed by: INTERNAL MEDICINE

## 2024-08-17 PROCEDURE — 1100000001 HC PRIVATE ROOM DAILY

## 2024-08-17 PROCEDURE — 99232 SBSQ HOSP IP/OBS MODERATE 35: CPT | Performed by: INTERNAL MEDICINE

## 2024-08-17 RX ORDER — CARVEDILOL 3.12 MG/1
3.12 TABLET ORAL 2 TIMES DAILY
Status: DISCONTINUED | OUTPATIENT
Start: 2024-08-17 | End: 2024-08-20 | Stop reason: HOSPADM

## 2024-08-17 ASSESSMENT — COGNITIVE AND FUNCTIONAL STATUS - GENERAL
STANDING UP FROM CHAIR USING ARMS: A LITTLE
DRESSING REGULAR LOWER BODY CLOTHING: A LITTLE
TOILETING: A LITTLE
DAILY ACTIVITIY SCORE: 19
WALKING IN HOSPITAL ROOM: A LITTLE
MOVING TO AND FROM BED TO CHAIR: A LITTLE
MOVING FROM LYING ON BACK TO SITTING ON SIDE OF FLAT BED WITH BEDRAILS: A LITTLE
CLIMB 3 TO 5 STEPS WITH RAILING: A LOT
MOBILITY SCORE: 17
HELP NEEDED FOR BATHING: A LITTLE
DRESSING REGULAR UPPER BODY CLOTHING: A LITTLE
PERSONAL GROOMING: A LITTLE
TURNING FROM BACK TO SIDE WHILE IN FLAT BAD: A LITTLE

## 2024-08-17 ASSESSMENT — PAIN SCALES - GENERAL
PAINLEVEL_OUTOF10: 0 - NO PAIN
PAINLEVEL_OUTOF10: 0 - NO PAIN

## 2024-08-17 ASSESSMENT — PAIN - FUNCTIONAL ASSESSMENT: PAIN_FUNCTIONAL_ASSESSMENT: 0-10

## 2024-08-17 NOTE — CARE PLAN
Problem: Nutrition  Goal: Less than 5 days NPO/clear liquids  Outcome: Progressing  Goal: Oral intake greater than 50%  Outcome: Progressing  Goal: Oral intake greater 75%  Outcome: Progressing  Goal: Consume prescribed supplement  Outcome: Progressing  Goal: Adequate PO fluid intake  Outcome: Progressing  Goal: Nutrition support goals are met within 48 hrs  Outcome: Progressing  Goal: Nutrition support is meeting 75% of nutrient needs  Outcome: Progressing  Goal: Tube feed tolerance  Outcome: Progressing  Goal: BG  mg/dL  Outcome: Progressing  Goal: Lab values WNL  Outcome: Progressing  Goal: Electrolytes WNL  Outcome: Progressing  Goal: Promote healing  Outcome: Progressing  Goal: Maintain stable weight  Outcome: Progressing  Goal: Reduce weight from edema/fluid  Outcome: Progressing  Goal: Gradual weight gain  Outcome: Progressing  Goal: Improve ostomy output  Outcome: Progressing     Problem: Pain - Adult  Goal: Verbalizes/displays adequate comfort level or baseline comfort level  Outcome: Progressing     Problem: Safety - Adult  Goal: Free from fall injury  Outcome: Progressing     Problem: Discharge Planning  Goal: Discharge to home or other facility with appropriate resources  Outcome: Progressing     Problem: Chronic Conditions and Co-morbidities  Goal: Patient's chronic conditions and co-morbidity symptoms are monitored and maintained or improved  Outcome: Progressing     Problem: Skin  Goal: Decreased wound size/increased tissue granulation at next dressing change  Outcome: Progressing  Goal: Participates in plan/prevention/treatment measures  Outcome: Progressing  Flowsheets (Taken 8/16/2024 2204)  Participates in plan/prevention/treatment measures: Elevate heels  Goal: Prevent/manage excess moisture  Outcome: Progressing  Goal: Prevent/minimize sheer/friction injuries  Outcome: Progressing  Goal: Promote/optimize nutrition  Outcome: Progressing  Goal: Promote skin healing  Outcome: Progressing      Problem: Fall/Injury  Goal: Not fall by end of shift  Outcome: Progressing  Goal: Be free from injury by end of the shift  Outcome: Progressing  Goal: Verbalize understanding of personal risk factors for fall in the hospital  Outcome: Progressing  Goal: Verbalize understanding of risk factor reduction measures to prevent injury from fall in the home  Outcome: Progressing  Goal: Use assistive devices by end of the shift  Outcome: Progressing  Goal: Pace activities to prevent fatigue by end of the shift  Outcome: Progressing

## 2024-08-17 NOTE — PROGRESS NOTES
Bev Christine is a 89 y.o. female on day 2 of admission presenting with Head injury, initial encounter.      Subjective   1.5 L, continues to feel better. Afebrile. No overnight events reported.        Objective     Last Recorded Vitals  /77 (BP Location: Left arm, Patient Position: Lying)   Pulse 77   Temp 37 °C (98.6 °F) (Temporal)   Resp 16   Wt (!) 36.3 kg (80 lb)   SpO2 95%   Intake/Output last 3 Shifts:    Intake/Output Summary (Last 24 hours) at 8/17/2024 1055  Last data filed at 8/16/2024 2206  Gross per 24 hour   Intake 0 ml   Output --   Net 0 ml       Admission Weight  Weight: (!) 40.8 kg (90 lb) (08/14/24 2031)    Daily Weight  08/15/24 : (!) 36.3 kg (80 lb)    Image Results  ECG 12 lead  Normal sinus rhythm  Normal ECG  When compared with ECG of 18-JUL-2023 12:06,  Previous ECG has undetermined rhythm, needs review  See ED provider note for full interpretation and clinical correlation  Confirmed by Kimmy Banerjee ([id number]) on 8/15/2024 12:00:49 PM      Physical Exam  Constitutional:       Appearance: Normal appearance.   Cardiovascular:      Rate and Rhythm: Normal rate and regular rhythm.   Pulmonary:      Effort: Pulmonary effort is normal.      Breath sounds: Rhonchi present.   Abdominal:      General: Abdomen is flat. Bowel sounds are normal.      Palpations: Abdomen is soft.   Musculoskeletal:      Cervical back: Normal range of motion.   Skin:     General: Skin is warm.   Neurological:      General: No focal deficit present.      Mental Status: She is alert and oriented to person, place, and time. Mental status is at baseline.   Psychiatric:         Mood and Affect: Mood normal.         Behavior: Behavior normal.         Thought Content: Thought content normal.         Judgment: Judgment normal.         Relevant Results               Assessment/Plan          Assessment & Plan  Head injury, initial encounter        8/17:  PNA... 1.5L... cont iv abx, wean to RA.   HTN... elevated...  add coreg to home hydralazine and ARB. Will titrate up meds if needed.   Pt/ot  Possible dc tomorrow.       8/16:  PNA... cont iv abx, wean to room air.   Deconditioning, fall at [facility]... pt/ot.     Likely dc back to [facility] in 1-2 days.   Updated DTR who was bedside.         8/15:  PNA... on 1L, cough is better... cont iv abx, o2, anti-tussives.   HTN, elevated this morning... restart home meds with hydralazine, losartan.   Hx large hiatal hernia, ? Esophagitis or mass on imaging... she has no GERD symptoms right now... OP GI f/up.   Hx L neck cyst... cont OP monitoring.   Home regimen for chronic conditions  Dvt px with lovenox  Pt/ot  Pt lives in [facility]         Lucas Gallego MD

## 2024-08-18 VITALS
BODY MASS INDEX: 16.13 KG/M2 | DIASTOLIC BLOOD PRESSURE: 68 MMHG | RESPIRATION RATE: 16 BRPM | OXYGEN SATURATION: 95 % | SYSTOLIC BLOOD PRESSURE: 143 MMHG | TEMPERATURE: 98.6 F | WEIGHT: 80 LBS | HEIGHT: 59 IN | HEART RATE: 73 BPM

## 2024-08-18 LAB
ANION GAP SERPL CALC-SCNC: 10 MMOL/L (ref 10–20)
BACTERIA BLD CULT: NORMAL
BACTERIA BLD CULT: NORMAL
BUN SERPL-MCNC: 11 MG/DL (ref 6–23)
CALCIUM SERPL-MCNC: 8.9 MG/DL (ref 8.6–10.3)
CHLORIDE SERPL-SCNC: 103 MMOL/L (ref 98–107)
CO2 SERPL-SCNC: 27 MMOL/L (ref 21–32)
CREAT SERPL-MCNC: 0.63 MG/DL (ref 0.5–1.05)
EGFRCR SERPLBLD CKD-EPI 2021: 82 ML/MIN/1.73M*2
ERYTHROCYTE [DISTWIDTH] IN BLOOD BY AUTOMATED COUNT: 12.9 % (ref 11.5–14.5)
GLUCOSE SERPL-MCNC: 96 MG/DL (ref 74–99)
HCT VFR BLD AUTO: 32.8 % (ref 36–46)
HGB BLD-MCNC: 11.2 G/DL (ref 12–16)
MCH RBC QN AUTO: 31 PG (ref 26–34)
MCHC RBC AUTO-ENTMCNC: 34.1 G/DL (ref 32–36)
MCV RBC AUTO: 91 FL (ref 80–100)
NRBC BLD-RTO: 0 /100 WBCS (ref 0–0)
PLATELET # BLD AUTO: 262 X10*3/UL (ref 150–450)
POTASSIUM SERPL-SCNC: 4.2 MMOL/L (ref 3.5–5.3)
RBC # BLD AUTO: 3.61 X10*6/UL (ref 4–5.2)
SODIUM SERPL-SCNC: 136 MMOL/L (ref 136–145)
WBC # BLD AUTO: 9.6 X10*3/UL (ref 4.4–11.3)

## 2024-08-18 PROCEDURE — 2500000002 HC RX 250 W HCPCS SELF ADMINISTERED DRUGS (ALT 637 FOR MEDICARE OP, ALT 636 FOR OP/ED): Performed by: INTERNAL MEDICINE

## 2024-08-18 PROCEDURE — 2500000001 HC RX 250 WO HCPCS SELF ADMINISTERED DRUGS (ALT 637 FOR MEDICARE OP): Performed by: INTERNAL MEDICINE

## 2024-08-18 PROCEDURE — 99232 SBSQ HOSP IP/OBS MODERATE 35: CPT | Performed by: INTERNAL MEDICINE

## 2024-08-18 PROCEDURE — 80048 BASIC METABOLIC PNL TOTAL CA: CPT | Performed by: INTERNAL MEDICINE

## 2024-08-18 PROCEDURE — 85027 COMPLETE CBC AUTOMATED: CPT | Performed by: INTERNAL MEDICINE

## 2024-08-18 PROCEDURE — 36415 COLL VENOUS BLD VENIPUNCTURE: CPT | Performed by: INTERNAL MEDICINE

## 2024-08-18 PROCEDURE — 2500000004 HC RX 250 GENERAL PHARMACY W/ HCPCS (ALT 636 FOR OP/ED): Performed by: EMERGENCY MEDICINE

## 2024-08-18 PROCEDURE — 1100000001 HC PRIVATE ROOM DAILY

## 2024-08-18 PROCEDURE — 2500000004 HC RX 250 GENERAL PHARMACY W/ HCPCS (ALT 636 FOR OP/ED): Performed by: INTERNAL MEDICINE

## 2024-08-18 ASSESSMENT — COGNITIVE AND FUNCTIONAL STATUS - GENERAL
TOILETING: A LITTLE
DRESSING REGULAR UPPER BODY CLOTHING: A LITTLE
MOVING TO AND FROM BED TO CHAIR: A LITTLE
STANDING UP FROM CHAIR USING ARMS: A LITTLE
HELP NEEDED FOR BATHING: A LITTLE
WALKING IN HOSPITAL ROOM: A LITTLE
CLIMB 3 TO 5 STEPS WITH RAILING: A LOT
PERSONAL GROOMING: A LITTLE
MOBILITY SCORE: 17
TURNING FROM BACK TO SIDE WHILE IN FLAT BAD: A LITTLE
MOVING FROM LYING ON BACK TO SITTING ON SIDE OF FLAT BED WITH BEDRAILS: A LITTLE

## 2024-08-18 ASSESSMENT — PAIN SCALES - GENERAL
PAINLEVEL_OUTOF10: 0 - NO PAIN
PAINLEVEL_OUTOF10: 0 - NO PAIN

## 2024-08-18 NOTE — CARE PLAN
Problem: Nutrition  Goal: Less than 5 days NPO/clear liquids  Outcome: Progressing  Goal: Oral intake greater than 50%  Outcome: Progressing  Goal: Oral intake greater 75%  Outcome: Progressing  Goal: Consume prescribed supplement  Outcome: Progressing  Goal: Adequate PO fluid intake  Outcome: Progressing  Goal: Nutrition support goals are met within 48 hrs  Outcome: Progressing  Goal: Nutrition support is meeting 75% of nutrient needs  Outcome: Progressing  Goal: Tube feed tolerance  Outcome: Progressing  Goal: BG  mg/dL  Outcome: Progressing  Goal: Lab values WNL  Outcome: Progressing  Goal: Electrolytes WNL  Outcome: Progressing  Goal: Promote healing  Outcome: Progressing  Goal: Maintain stable weight  Outcome: Progressing  Goal: Reduce weight from edema/fluid  Outcome: Progressing  Goal: Gradual weight gain  Outcome: Progressing  Goal: Improve ostomy output  Outcome: Progressing     Problem: Pain - Adult  Goal: Verbalizes/displays adequate comfort level or baseline comfort level  Outcome: Progressing     Problem: Safety - Adult  Goal: Free from fall injury  Outcome: Progressing     Problem: Discharge Planning  Goal: Discharge to home or other facility with appropriate resources  Outcome: Progressing     Problem: Chronic Conditions and Co-morbidities  Goal: Patient's chronic conditions and co-morbidity symptoms are monitored and maintained or improved  Outcome: Progressing     Problem: Skin  Goal: Decreased wound size/increased tissue granulation at next dressing change  Outcome: Progressing  Flowsheets (Taken 8/17/2024 2312)  Decreased wound size/increased tissue granulation at next dressing change: Promote sleep for wound healing  Goal: Participates in plan/prevention/treatment measures  Outcome: Progressing  Goal: Prevent/manage excess moisture  Outcome: Progressing  Goal: Prevent/minimize sheer/friction injuries  Outcome: Progressing  Goal: Promote/optimize nutrition  Outcome: Progressing  Goal:  Promote skin healing  Outcome: Progressing     Problem: Fall/Injury  Goal: Not fall by end of shift  Outcome: Progressing  Goal: Be free from injury by end of the shift  Outcome: Progressing  Goal: Verbalize understanding of personal risk factors for fall in the hospital  Outcome: Progressing  Goal: Verbalize understanding of risk factor reduction measures to prevent injury from fall in the home  Outcome: Progressing  Goal: Use assistive devices by end of the shift  Outcome: Progressing  Goal: Pace activities to prevent fatigue by end of the shift  Outcome: Progressing

## 2024-08-18 NOTE — PROGRESS NOTES
08/18/24 0747   Current Planned Discharge Disposition   Current Planned Discharge Disposition Home     Per notes patient is from [facility], no barriers to return, patient on IV levshaw, PT recommending [facility] will continue to monitor for discharge planning.

## 2024-08-18 NOTE — PROGRESS NOTES
Bev Christine is a 89 y.o. female on day 3 of admission presenting with Head injury, initial encounter.      Subjective   Still 1.5 L, continuing to feel better. Afebrile. No overnight events reported.        Objective     Last Recorded Vitals  /72 (BP Location: Left arm, Patient Position: Lying)   Pulse 72   Temp 37 °C (98.6 °F) (Temporal)   Resp 18   Wt (!) 36.3 kg (80 lb)   SpO2 96%   Intake/Output last 3 Shifts:  No intake or output data in the 24 hours ending 08/18/24 1024      Admission Weight  Weight: (!) 40.8 kg (90 lb) (08/14/24 2031)    Daily Weight  08/15/24 : (!) 36.3 kg (80 lb)    Image Results  ECG 12 lead  Normal sinus rhythm  Normal ECG  When compared with ECG of 18-JUL-2023 12:06,  Previous ECG has undetermined rhythm, needs review  See ED provider note for full interpretation and clinical correlation  Confirmed by Kimmy Banerjee ([id number]) on 8/15/2024 12:00:49 PM      Physical Exam  Constitutional:       Appearance: Normal appearance.   Cardiovascular:      Rate and Rhythm: Normal rate and regular rhythm.   Pulmonary:      Effort: Pulmonary effort is normal.      Breath sounds: Rhonchi present.   Abdominal:      General: Abdomen is flat. Bowel sounds are normal.      Palpations: Abdomen is soft.   Musculoskeletal:      Cervical back: Normal range of motion.   Skin:     General: Skin is warm.   Neurological:      General: No focal deficit present.      Mental Status: She is alert and oriented to person, place, and time. Mental status is at baseline.   Psychiatric:         Mood and Affect: Mood normal.         Behavior: Behavior normal.         Thought Content: Thought content normal.         Judgment: Judgment normal.         Relevant Results               Assessment/Plan          Assessment & Plan  Head injury, initial encounter      8/18:  Cont iv abx, try to wean to RA today.   HTN... better this morning... monitor. Titrate up hydralazine if needed.   Pt/ot    Dc [facility] tomorrow        8/17:  PNA... 1.5L... cont iv abx, wean to RA.   HTN... elevated... add coreg to home hydralazine and ARB. Will titrate up meds if needed.   Pt/ot  Possible dc tomorrow.       8/16:  PNA... cont iv abx, wean to room air.   Deconditioning, fall at [facility]... pt/ot.     Likely dc back to [facility] in 1-2 days.   Updated DTR who was bedside.         8/15:  PNA... on 1L, cough is better... cont iv abx, o2, anti-tussives.   HTN, elevated this morning... restart home meds with hydralazine, losartan.   Hx large hiatal hernia, ? Esophagitis or mass on imaging... she has no GERD symptoms right now... OP GI f/up.   Hx L neck cyst... cont OP monitoring.   Home regimen for chronic conditions  Dvt px with lovenox  Pt/ot  Pt lives in residential         Lucas Gallego MD

## 2024-08-19 LAB
BACTERIA BLD CULT: NORMAL
BACTERIA BLD CULT: NORMAL

## 2024-08-19 PROCEDURE — 2500000002 HC RX 250 W HCPCS SELF ADMINISTERED DRUGS (ALT 637 FOR MEDICARE OP, ALT 636 FOR OP/ED): Performed by: INTERNAL MEDICINE

## 2024-08-19 PROCEDURE — 97535 SELF CARE MNGMENT TRAINING: CPT | Mod: GO

## 2024-08-19 PROCEDURE — 2500000004 HC RX 250 GENERAL PHARMACY W/ HCPCS (ALT 636 FOR OP/ED): Performed by: EMERGENCY MEDICINE

## 2024-08-19 PROCEDURE — 99239 HOSP IP/OBS DSCHRG MGMT >30: CPT | Performed by: INTERNAL MEDICINE

## 2024-08-19 PROCEDURE — 1100000001 HC PRIVATE ROOM DAILY

## 2024-08-19 PROCEDURE — 2500000001 HC RX 250 WO HCPCS SELF ADMINISTERED DRUGS (ALT 637 FOR MEDICARE OP): Performed by: INTERNAL MEDICINE

## 2024-08-19 PROCEDURE — 2500000004 HC RX 250 GENERAL PHARMACY W/ HCPCS (ALT 636 FOR OP/ED): Performed by: INTERNAL MEDICINE

## 2024-08-19 RX ORDER — BENZONATATE 100 MG/1
100 CAPSULE ORAL 3 TIMES DAILY PRN
Qty: 20 CAPSULE | Refills: 0 | Status: SHIPPED | OUTPATIENT
Start: 2024-08-19

## 2024-08-19 RX ORDER — CARVEDILOL 3.12 MG/1
3.12 TABLET ORAL 2 TIMES DAILY
Qty: 60 TABLET | Refills: 0 | Status: SHIPPED | OUTPATIENT
Start: 2024-08-19 | End: 2024-09-18

## 2024-08-19 ASSESSMENT — PAIN SCALES - GENERAL
PAINLEVEL_OUTOF10: 6
PAINLEVEL_OUTOF10: 4
PAINLEVEL_OUTOF10: 0 - NO PAIN
PAINLEVEL_OUTOF10: 2
PAINLEVEL_OUTOF10: 2
PAINLEVEL_OUTOF10: 0 - NO PAIN
PAINLEVEL_OUTOF10: 3
PAINLEVEL_OUTOF10: 6
PAINLEVEL_OUTOF10: 8

## 2024-08-19 ASSESSMENT — PAIN - FUNCTIONAL ASSESSMENT
PAIN_FUNCTIONAL_ASSESSMENT: 0-10

## 2024-08-19 ASSESSMENT — COGNITIVE AND FUNCTIONAL STATUS - GENERAL
CLIMB 3 TO 5 STEPS WITH RAILING: TOTAL
HELP NEEDED FOR BATHING: A LOT
PERSONAL GROOMING: A LOT
EATING MEALS: A LITTLE
DRESSING REGULAR UPPER BODY CLOTHING: A LITTLE
TURNING FROM BACK TO SIDE WHILE IN FLAT BAD: A LOT
DRESSING REGULAR UPPER BODY CLOTHING: A LITTLE
WALKING IN HOSPITAL ROOM: A LOT
MOVING FROM LYING ON BACK TO SITTING ON SIDE OF FLAT BED WITH BEDRAILS: A LOT
STANDING UP FROM CHAIR USING ARMS: A LOT
TURNING FROM BACK TO SIDE WHILE IN FLAT BAD: A LOT
HELP NEEDED FOR BATHING: A LOT
DAILY ACTIVITIY SCORE: 15
TOILETING: A LOT
DAILY ACTIVITIY SCORE: 15
DAILY ACTIVITIY SCORE: 13
TURNING FROM BACK TO SIDE WHILE IN FLAT BAD: A LOT
HELP NEEDED FOR BATHING: A LOT
STANDING UP FROM CHAIR USING ARMS: A LOT
TOILETING: A LITTLE
DAILY ACTIVITIY SCORE: 17
PERSONAL GROOMING: A LITTLE
MOBILITY SCORE: 11
HELP NEEDED FOR BATHING: A LOT
MOBILITY SCORE: 11
MOVING FROM LYING ON BACK TO SITTING ON SIDE OF FLAT BED WITH BEDRAILS: A LOT
TOILETING: A LOT
MOVING TO AND FROM BED TO CHAIR: A LOT
DAILY ACTIVITIY SCORE: 15
DRESSING REGULAR LOWER BODY CLOTHING: A LOT
PERSONAL GROOMING: A LOT
TURNING FROM BACK TO SIDE WHILE IN FLAT BAD: A LOT
DRESSING REGULAR LOWER BODY CLOTHING: A LOT
MOVING FROM LYING ON BACK TO SITTING ON SIDE OF FLAT BED WITH BEDRAILS: A LOT
MOVING TO AND FROM BED TO CHAIR: A LOT
PERSONAL GROOMING: A LOT
CLIMB 3 TO 5 STEPS WITH RAILING: TOTAL
STANDING UP FROM CHAIR USING ARMS: A LOT
MOBILITY SCORE: 11
DRESSING REGULAR LOWER BODY CLOTHING: A LOT
WALKING IN HOSPITAL ROOM: A LOT
TOILETING: A LOT
WALKING IN HOSPITAL ROOM: A LOT
STANDING UP FROM CHAIR USING ARMS: A LOT
WALKING IN HOSPITAL ROOM: A LOT
DRESSING REGULAR UPPER BODY CLOTHING: A LITTLE
CLIMB 3 TO 5 STEPS WITH RAILING: A LOT
DRESSING REGULAR LOWER BODY CLOTHING: A LOT
MOBILITY SCORE: 12
MOVING FROM LYING ON BACK TO SITTING ON SIDE OF FLAT BED WITH BEDRAILS: A LOT
DRESSING REGULAR UPPER BODY CLOTHING: A LOT
DRESSING REGULAR LOWER BODY CLOTHING: A LOT
TOILETING: A LOT
MOVING TO AND FROM BED TO CHAIR: A LOT
CLIMB 3 TO 5 STEPS WITH RAILING: TOTAL
HELP NEEDED FOR BATHING: A LOT
PERSONAL GROOMING: A LOT
DRESSING REGULAR UPPER BODY CLOTHING: A LITTLE
MOVING TO AND FROM BED TO CHAIR: A LOT

## 2024-08-19 ASSESSMENT — PAIN DESCRIPTION - LOCATION
LOCATION: RIB CAGE
LOCATION: LEG

## 2024-08-19 ASSESSMENT — PAIN DESCRIPTION - DESCRIPTORS: DESCRIPTORS: ACHING

## 2024-08-19 ASSESSMENT — ACTIVITIES OF DAILY LIVING (ADL): HOME_MANAGEMENT_TIME_ENTRY: 17

## 2024-08-19 ASSESSMENT — PAIN DESCRIPTION - ORIENTATION: ORIENTATION: RIGHT;LEFT

## 2024-08-19 ASSESSMENT — PAIN SCALES - PAIN ASSESSMENT IN ADVANCED DEMENTIA (PAINAD): TOTALSCORE: MEDICATION (SEE MAR)

## 2024-08-19 NOTE — PROGRESS NOTES
Occupational Therapy    OT Treatment    Patient Name: Bev Christine  MRN: [id number]  Today's Date: 8/19/2024  Time Calculation  Start Time: 1452  Stop Time: 1509  Time Calculation (min): 17 min        Assessment:  OT Assessment:  (Pt continues to function below baseline for ADLs and functional mobility. At this time pt would benefit from continued therapy, recommend mod intensity therapies.)  Prognosis: Good  Barriers to Discharge: None  Evaluation/Treatment Tolerance: Patient tolerated treatment well  Medical Staff Made Aware: Yes  End of Session Communication: Bedside nurse  End of Session Patient Position: Bed, 3 rail up, Alarm on  OT Assessment Results: Decreased ADL status, Decreased upper extremity range of motion, Decreased safe judgment during ADL, Decreased cognition, Decreased functional mobility, Decreased IADLs  Prognosis: Good  Barriers to Discharge: None  Evaluation/Treatment Tolerance: Patient tolerated treatment well  Medical Staff Made Aware: Yes  Strengths: Ability to acquire knowledge, Capable of completing ADLs semi/independent  Plan:  Treatment Interventions: ADL retraining, Functional transfer training, UE strengthening/ROM  OT Frequency: 2 times per week  OT Discharge Recommendations: High intensity level of continued care  OT - OK to Discharge: Yes  Treatment Interventions: ADL retraining, Functional transfer training, UE strengthening/ROM    Subjective   Previous Visit Info:  OT Last Visit  OT Received On: 08/19/24  General:  General  Reason for Referral: s/p fall off couch, reports cough and SOB  Past Medical History Relevant to Rehab: History reviewed. No pertinent past medical history.    Prior to Session Communication: Bedside nurse  Patient Position Received: Bed, 3 rail up, Alarm on  Preferred Learning Style: auditory, verbal  General Comment:  (pt supine in bed upon entry, agreeable to therapy)  Precautions:  Medical Precautions: Fall precautions       Pain:  Pain Assessment  Pain  Assessment: 0-10  0-10 (Numeric) Pain Score: 6  Pain Type: Acute pain  Pain Location: Rib cage  Pain Interventions: Ambulation/increased activity, Repositioned (RN in room to provide meds)    Objective    Cognition:  Cognition  Overall Cognitive Status: Within Functional Limits  Orientation Level: Disoriented to time  Attention: Within Functional Limits  Safety/Judgement: Exceptions to WFL  Insight: Mild  Impulsive: Within functional limits       Activities of Daily Living:           UE Dressing  UE Dressing Level of Assistance: Minimum assistance  UE Dressing Where Assessed: Bed level  UE Dressing Comments:  (don/doff gown)         Toileting  Toileting Level of Assistance: Minimum assistance  Where Assessed: Toilet  Toileting Comments:  (assist needed for toilet transfer, pt able to complete migel-care without assist)  Functional Standing Tolerance:  Time: 2 minutes  Activity: hand hygiene at sink  Functional Standing Tolerance Comments: no LOB noted  Bed Mobility/Transfers: Bed Mobility  Bed Mobility: Yes  Bed Mobility 1  Bed Mobility 1: Rolling right, Rolling left  Level of Assistance 1: Contact guard  Bed Mobility Comments 1: use of bedrail to assist  Bed Mobility 2  Bed Mobility  2: Supine to sitting  Level of Assistance 2: Minimum assistance  Bed Mobility Comments 2: sit to supine: SBA    Transfers  Transfer: Yes  Transfer 1  Technique 1: Sit to stand, Stand to sit  Transfer Device 1: Walker  Transfer Level of Assistance 1: Minimum assistance  Trials/Comments 1: completed from EOB and toilet, min A for transfers. use of FWW, VC for hand placement    Toilet Transfers  Toilet Transfer From: Walker  Toilet Transfer Technique: Ambulating  Toilet Transfers: Minimal assistance    Functional Mobility:  Functional Mobility  Functional Mobility Performed: Yes  Functional Mobility 1  Surface 1: Level tile  Device 1: Rolling walker  Assistance 1: Contact guard  Comments 1:  (to/from restroom)  Sitting Balance:  Static  Sitting Balance  Static Sitting-Balance Support: Feet supported  Static Sitting-Level of Assistance: Distant supervision  Dynamic Sitting Balance  Dynamic Sitting-Balance Support: Feet supported  Dynamic Sitting-Level of Assistance: Close supervision  Dynamic Sitting-Balance: Forward lean, Lateral lean  Standing Balance:  Static Standing Balance  Static Standing-Balance Support: Bilateral upper extremity supported  Static Standing-Level of Assistance: Close supervision  Dynamic Standing Balance  Dynamic Standing-Balance Support: No upper extremity supported  Dynamic Standing-Level of Assistance: Contact guard  Dynamic Standing-Comments: no LOB noted           Outcome Measures:[facility] Daily Activity  Putting on and taking off regular lower body clothing: A lot  Bathing (including washing, rinsing, drying): A lot  Putting on and taking off regular upper body clothing: A little  Toileting, which includes using toilet, bedpan or urinal: A little  Taking care of personal grooming such as brushing teeth: A little  Eating Meals: None  Daily Activity - Total Score: 17        Education Documentation  Body Mechanics, taught by Trixie Davis OT at 8/19/2024  3:36 PM.  Learner: Patient  Readiness: Acceptance  Method: Explanation  Response: Verbalizes Understanding    ADL Training, taught by Trixie Davis OT at 8/19/2024  3:36 PM.  Learner: Patient  Readiness: Acceptance  Method: Explanation  Response: Verbalizes Understanding    Education Comments  No comments found.        OP EDUCATION:       Goals:  Encounter Problems       Encounter Problems (Active)       ADLs       Patient will perform UB and LB bathing with stand by assist level of assistance and shower chair. (Progressing)       Start:  08/16/24    Expected End:  08/30/24            Patient with complete upper body dressing with stand by assist level of assistance donning and doffing all UE clothes with no adaptive equipment while edge of bed  (Progressing)        Start:  08/16/24    Expected End:  08/30/24            Patient with complete lower body dressing with stand by assist level of assistance donning and doffing all LE clothes  with no adaptive equipment while edge of bed  (Progressing)       Start:  08/16/24    Expected End:  08/30/24            Patient will complete daily grooming tasks brushing teeth and washing face/hair with supervision level of assistance and PRN adaptive equipment while standing. (Progressing)       Start:  08/16/24    Expected End:  08/30/24            Patient will complete toileting including hygiene clothing management/hygiene with stand by assist level of assistance and grab bars. (Progressing)       Start:  08/16/24    Expected End:  08/30/24               MOBILITY       Patient will perform Functional mobility min Household distances/Community Distances with supervision level of assistance and least restrictive device in order to improve safety and functional mobility. (Progressing)       Start:  08/16/24    Expected End:  08/30/24

## 2024-08-19 NOTE — CARE PLAN
The patient's goals for the shift include  sleep without sob    The clinical goals for the shift include safety, HD stability

## 2024-08-19 NOTE — CONSULTS
Nutrition Assessment Note    -late entry-    Reason for Assessment  Reason for Assessment: Admission nursing screening    Pt admitted for:  Hypoxia [R09.02]  Head injury, initial encounter [S09.90XA]  Closed wedge compression fracture of T10 vertebra, initial encounter (Multi) [S22.070A]  Pneumonia of both lungs due to infectious organism, unspecified part of lung [J18.9]  Sepsis, due to unspecified organism, unspecified whether acute organ dysfunction present (Multi) [A41.9]    MST triggered for weight loss and eating poorly due to decreased appetite.  Chart reviewed and pt visited.  Per pt appetite varies.  UBW 89#.    Results for orders placed or performed during the hospital encounter of 08/14/24 (from the past 96 hour(s))   POCT GLUCOSE   Result Value Ref Range    POCT Glucose 91 74 - 99 mg/dL   Basic Metabolic Panel   Result Value Ref Range    Glucose 87 74 - 99 mg/dL    Sodium 135 (L) 136 - 145 mmol/L    Potassium 4.0 3.5 - 5.3 mmol/L    Chloride 103 98 - 107 mmol/L    Bicarbonate 25 21 - 32 mmol/L    Anion Gap 11 10 - 20 mmol/L    Urea Nitrogen 14 6 - 23 mg/dL    Creatinine 0.66 0.50 - 1.05 mg/dL    eGFR 81 >60 mL/min/1.73m*2    Calcium 8.8 8.6 - 10.3 mg/dL   CBC   Result Value Ref Range    WBC 9.4 4.4 - 11.3 x10*3/uL    nRBC 0.0 0.0 - 0.0 /100 WBCs    RBC 3.49 (L) 4.00 - 5.20 x10*6/uL    Hemoglobin 11.0 (L) 12.0 - 16.0 g/dL    Hematocrit 34.5 (L) 36.0 - 46.0 %    MCV 99 80 - 100 fL    MCH 31.5 26.0 - 34.0 pg    MCHC 31.9 (L) 32.0 - 36.0 g/dL    RDW 13.5 11.5 - 14.5 %    Platelets 201 150 - 450 x10*3/uL   Basic Metabolic Panel   Result Value Ref Range    Glucose 96 74 - 99 mg/dL    Sodium 136 136 - 145 mmol/L    Potassium 4.2 3.5 - 5.3 mmol/L    Chloride 103 98 - 107 mmol/L    Bicarbonate 27 21 - 32 mmol/L    Anion Gap 10 10 - 20 mmol/L    Urea Nitrogen 11 6 - 23 mg/dL    Creatinine 0.63 0.50 - 1.05 mg/dL    eGFR 82 >60 mL/min/1.73m*2    Calcium 8.9 8.6 - 10.3 mg/dL   CBC   Result Value Ref Range    WBC 9.6  "4.4 - 11.3 x10*3/uL    nRBC 0.0 0.0 - 0.0 /100 WBCs    RBC 3.61 (L) 4.00 - 5.20 x10*6/uL    Hemoglobin 11.2 (L) 12.0 - 16.0 g/dL    Hematocrit 32.8 (L) 36.0 - 46.0 %    MCV 91 80 - 100 fL    MCH 31.0 26.0 - 34.0 pg    MCHC 34.1 32.0 - 36.0 g/dL    RDW 12.9 11.5 - 14.5 %    Platelets 262 150 - 450 x10*3/uL      Scheduled medications  carvedilol, 3.125 mg, oral, BID  cholecalciferol, 1,000 Units, oral, Daily  docusate sodium, 100 mg, oral, BID  enoxaparin, 30 mg, subcutaneous, Daily  hydrALAZINE, 25 mg, oral, Daily  levoFLOXacin, 750 mg, intravenous, Every other day  losartan, 100 mg, oral, Daily  magnesium oxide, 400 mg, oral, Daily  pantoprazole, 40 mg, oral, Daily before breakfast  potassium chloride CR, 10 mEq, oral, BID  sertraline, 25 mg, oral, Daily      Continuous medications     PRN medications  PRN medications: acetaminophen, benzonatate  Dietary Orders (From admission, onward)       Start     Ordered    08/16/24 0915  Adult diet Regular  Diet effective now        Question:  Diet type  Answer:  Regular    08/16/24 0914    08/16/24 0838  Oral nutritional supplements  Until discontinued        Question Answer Comment   Deliver with Breakfast    Deliver with Lunch    Deliver with All meals    Deliver with Dinner    Select supplement: Ensure Plus High Protein        08/16/24 0837    08/15/24 1601  May Participate in Room Service  Once        Question:  .  Answer:  Yes    08/15/24 1600                  History:  Food and Nutrient History  Food and Nutrient History: appetite varies    Anthropometrics:  Height: 149.9 cm (4' 11\")  Weight: (!) 36.3 kg (80 lb)  BMI (Calculated): 16.15    Weight Change  Weight History / % Weight Change: 36.5kg 7/9/24, 38.1kg 3/25/24, 40.0kg 11/20/23, 39.1kg 9/1/23  Significant Weight Loss: No    IBW/kg (Dietitian Calculated): 43.2 kg    Estimated Energy Needs  Total Energy Estimated Needs (kCal): 1300 kCal  Total Estimated Energy Need per Day (kCal/kg): 1515 kCal/kg  Method for " Estimating Needs: 30-35 IBW    Estimated Protein Needs  Total Protein Estimated Needs (g): 40 g  Total Protein Estimated Needs (g/kg): 65 g/kg  Method for Estimating Needs: 1.0-1.5 IBW    Estimated Fluid Needs  Method for Estimating Needs: 1ml/kcal or per MD    Nutrition Focused Physical Findings:  Subcutaneous Fat Loss  Orbital Fat Pads: Well nourished (slightly bulging fat pads)  Buccal Fat Pads: Well nourished (full, rounded cheeks)    Muscle Wasting  Temporalis: Well nourished (well-defined muscle)    Edema  Edema: none    Physical Findings (Nutrition Deficiency/Toxicity)  Skin: Negative     Nutrition Diagnosis   Patient has Nutrition Diagnosis: Yes  Nutrition Diagnosis 1: Underweight  Diagnosis Status (1): New  Related to (1): chronic illness  As Evidenced by (1): BMI 16.2       Nutrition Interventions/Recommendations   Food and/or Nutrient Delivery Interventions  Goal: > 75% of meals consumed     Medical Food Supplement: Commercial beverage  Goal: Ensure TID for encouraged intake    Additional Interventions: Consider appetite stimulant    Education Documentation  No documentation found.      Nutrition Monitoring and Evaluation   Food and Nutrient Related History  Energy Intake: Estimated energy intake    Fluid Intake: Estimated fluid intake    Amount of Food: Estimated amout of food, Medical food intake    Mealtime Behavior: Limited number of accepted foods    Anthropometrics: Body Composition/Growth/Weight History  Weight Change: Weight gain, Weight loss    Biochemical Data, Medical Tests and Procedures  Electrolyte and Renal Panel: Other (Comment)  Criteria: as clinically indicated    Gastrointestinal Profile: Other (Comment)  Criteria: as clinically indicated    Glucose/Endocrine Profile: Other (Comment)  Criteria: as clinically indicated    Nutritional Anemia Profile: Other (Comment)  Criteria: as clinically indicated    Vitamin Profile: Other (Comment)  Criteria: as clinically indicated    Nutrition  Focused Physical Findings  Other: Stool output, Urine volume, Overall appearance    Follow Up  Time Spent (min): 60 minutes  Last Date of Nutrition Visit: 08/15/24  Nutrition Follow-Up Needed?: Dietitian to reassess per policy  Follow up Comment: ERNESTO CAMPOS

## 2024-08-19 NOTE — DISCHARGE SUMMARY
Discharge Diagnosis  Head injury, initial encounter      Discharge Meds     Your medication list        START taking these medications        Instructions Last Dose Given Next Dose Due   benzonatate 100 mg capsule  Commonly known as: Tessalon      Take 1 capsule (100 mg) by mouth 3 times a day as needed for cough. Do not crush or chew.       carvedilol 3.125 mg tablet  Commonly known as: Coreg      Take 1 tablet (3.125 mg) by mouth 2 times a day.              CONTINUE taking these medications        Instructions Last Dose Given Next Dose Due   acetaminophen 500 mg tablet  Commonly known as: Tylenol           cholecalciferol 25 MCG (1000 UT) tablet  Commonly known as: Vitamin D-3           hydrALAZINE 25 mg tablet  Commonly known as: Apresoline           losartan 100 mg tablet  Commonly known as: Cozaar           multivitamin tablet           potassium chloride CR 10 mEq ER tablet  Commonly known as: Klor-Con           sertraline 25 mg tablet  Commonly known as: Zoloft                     Where to Get Your Medications        These medications were sent to [facility] - [facility] AT Portal to Registered [facility]  [facility], [place] [address]      Phone: [phone]   benzonatate 100 mg capsule  carvedilol 3.125 mg tablet         Test Results Pending At Discharge  Pending Labs       Order Current Status    Blood Culture Preliminary result    Blood Culture Preliminary result            Hospital Course       8/18:  PNA... on room air, did not qualify for home o2.... has completed a course of abx. Anti-tussives scripted.     HTN.. coreg add to home meds.     Dc is likely rehab part of her [facility].   I updated daughter who agrees with dc plan.     Pt clinically and hemodynamically stable, tolerating PO intake and room air.   Instructed to F/U with PCP within 5 days.   She understands and agrees with the dc plan.     More than 30 min spent on dc.  "        8/17:  PNA... 1.5L... cont iv abx, wean to RA.   HTN... elevated... add coreg to home hydralazine and ARB. Will titrate up meds if needed.   Pt/ot  Possible dc tomorrow.         8/16:  PNA... cont iv abx, wean to room air.   Deconditioning, fall at [facility]... pt/ot.      Likely dc back to [facility] in 1-2 days.   Updated DTR who was bedside.            8/15:  PNA... on 1L, cough is better... cont iv abx, o2, anti-tussives.   HTN, elevated this morning... restart home meds with hydralazine, losartan.   Hx large hiatal hernia, ? Esophagitis or mass on imaging... she has no GERD symptoms right now... OP GI f/up.   Hx L neck cyst... cont OP monitoring.   Home regimen for chronic conditions  Dvt px with lovenox  Pt/ot  Pt lives in [facility]    Pertinent Physical Exam At Time of Discharge  Physical Exam  Constitutional:       Appearance: Normal appearance.   Cardiovascular:      Rate and Rhythm: Normal rate and regular rhythm.   Pulmonary:      Effort: Pulmonary effort is normal.      Breath sounds: Normal breath sounds.   Abdominal:      General: Abdomen is flat. Bowel sounds are normal.      Palpations: Abdomen is soft.   Musculoskeletal:      Cervical back: Normal range of motion.   Skin:     General: Skin is warm.   Neurological:      General: No focal deficit present.      Mental Status: She is alert and oriented to person, place, and time. Mental status is at baseline.   Psychiatric:         Mood and Affect: Mood normal.         Behavior: Behavior normal.         Thought Content: Thought content normal.         Judgment: Judgment normal.     /55 (BP Location: Left arm, Patient Position: Lying)   Pulse 73   Temp 36.7 °C (98.1 °F) (Temporal)   Resp 16   Ht 1.499 m (4' 11\")   Wt (!) 36.3 kg (80 lb)   SpO2 99%   BMI 16.16 kg/m²       Outpatient Follow-Up  No future appointments.      Lucas Gallego MD  "

## 2024-08-20 ENCOUNTER — DOCUMENTATION (OUTPATIENT)
Dept: HOME HEALTH SERVICES | Facility: HOME HEALTH | Age: 89
End: 2024-08-20
Payer: MEDICARE

## 2024-08-20 VITALS
HEIGHT: 59 IN | RESPIRATION RATE: 16 BRPM | SYSTOLIC BLOOD PRESSURE: 166 MMHG | BODY MASS INDEX: 16.13 KG/M2 | WEIGHT: 80 LBS | HEART RATE: 72 BPM | TEMPERATURE: 97.9 F | DIASTOLIC BLOOD PRESSURE: 81 MMHG | OXYGEN SATURATION: 92 %

## 2024-08-20 PROCEDURE — 2500000004 HC RX 250 GENERAL PHARMACY W/ HCPCS (ALT 636 FOR OP/ED): Performed by: EMERGENCY MEDICINE

## 2024-08-20 PROCEDURE — 2500000002 HC RX 250 W HCPCS SELF ADMINISTERED DRUGS (ALT 637 FOR MEDICARE OP, ALT 636 FOR OP/ED): Performed by: INTERNAL MEDICINE

## 2024-08-20 PROCEDURE — 97530 THERAPEUTIC ACTIVITIES: CPT | Mod: GP,CQ

## 2024-08-20 PROCEDURE — 97116 GAIT TRAINING THERAPY: CPT | Mod: GP,CQ

## 2024-08-20 PROCEDURE — 2500000001 HC RX 250 WO HCPCS SELF ADMINISTERED DRUGS (ALT 637 FOR MEDICARE OP): Performed by: INTERNAL MEDICINE

## 2024-08-20 ASSESSMENT — COGNITIVE AND FUNCTIONAL STATUS - GENERAL
WALKING IN HOSPITAL ROOM: A LOT
PERSONAL GROOMING: A LOT
CLIMB 3 TO 5 STEPS WITH RAILING: A LOT
MOBILITY SCORE: 11
MOVING TO AND FROM BED TO CHAIR: A LITTLE
WALKING IN HOSPITAL ROOM: A LITTLE
MOVING FROM LYING ON BACK TO SITTING ON SIDE OF FLAT BED WITH BEDRAILS: A LITTLE
TOILETING: A LITTLE
TURNING FROM BACK TO SIDE WHILE IN FLAT BAD: A LOT
TOILETING: A LOT
DAILY ACTIVITIY SCORE: 15
MOVING TO AND FROM BED TO CHAIR: A LITTLE
DRESSING REGULAR LOWER BODY CLOTHING: A LOT
TOILETING: A LOT
WALKING IN HOSPITAL ROOM: A LITTLE
MOVING FROM LYING ON BACK TO SITTING ON SIDE OF FLAT BED WITH BEDRAILS: A LOT
MOBILITY SCORE: 11
DAILY ACTIVITIY SCORE: 17
PERSONAL GROOMING: A LOT
MOVING FROM LYING ON BACK TO SITTING ON SIDE OF FLAT BED WITH BEDRAILS: A LOT
TURNING FROM BACK TO SIDE WHILE IN FLAT BAD: A LOT
STANDING UP FROM CHAIR USING ARMS: A LOT
MOVING FROM LYING ON BACK TO SITTING ON SIDE OF FLAT BED WITH BEDRAILS: A LITTLE
DAILY ACTIVITIY SCORE: 15
DRESSING REGULAR UPPER BODY CLOTHING: A LITTLE
CLIMB 3 TO 5 STEPS WITH RAILING: TOTAL
TURNING FROM BACK TO SIDE WHILE IN FLAT BAD: A LITTLE
MOBILITY SCORE: 17
HELP NEEDED FOR BATHING: A LOT
PERSONAL GROOMING: A LOT
MOVING TO AND FROM BED TO CHAIR: A LOT
CLIMB 3 TO 5 STEPS WITH RAILING: TOTAL
WALKING IN HOSPITAL ROOM: A LOT
STANDING UP FROM CHAIR USING ARMS: A LOT
HELP NEEDED FOR BATHING: A LOT
DRESSING REGULAR LOWER BODY CLOTHING: A LOT
MOVING TO AND FROM BED TO CHAIR: A LOT
DRESSING REGULAR UPPER BODY CLOTHING: A LITTLE
MOVING FROM LYING ON BACK TO SITTING ON SIDE OF FLAT BED WITH BEDRAILS: A LOT
TURNING FROM BACK TO SIDE WHILE IN FLAT BAD: A LOT
DRESSING REGULAR LOWER BODY CLOTHING: A LOT
TURNING FROM BACK TO SIDE WHILE IN FLAT BAD: A LITTLE
MOBILITY SCORE: 11
DRESSING REGULAR UPPER BODY CLOTHING: A LITTLE
TOILETING: A LOT
DRESSING REGULAR LOWER BODY CLOTHING: A LOT
HELP NEEDED FOR BATHING: A LOT
MOBILITY SCORE: 17
HELP NEEDED FOR BATHING: A LOT
WALKING IN HOSPITAL ROOM: A LOT
CLIMB 3 TO 5 STEPS WITH RAILING: A LOT
STANDING UP FROM CHAIR USING ARMS: A LITTLE
CLIMB 3 TO 5 STEPS WITH RAILING: TOTAL
DAILY ACTIVITIY SCORE: 15
PERSONAL GROOMING: A LITTLE
STANDING UP FROM CHAIR USING ARMS: A LOT
STANDING UP FROM CHAIR USING ARMS: A LITTLE
DRESSING REGULAR UPPER BODY CLOTHING: A LITTLE
MOVING TO AND FROM BED TO CHAIR: A LOT

## 2024-08-20 ASSESSMENT — PAIN SCALES - WONG BAKER: WONGBAKER_NUMERICALRESPONSE: NO HURT

## 2024-08-20 ASSESSMENT — PAIN - FUNCTIONAL ASSESSMENT
PAIN_FUNCTIONAL_ASSESSMENT: WONG-BAKER FACES
PAIN_FUNCTIONAL_ASSESSMENT: 0-10

## 2024-08-20 ASSESSMENT — PAIN DESCRIPTION - LOCATION: LOCATION: BACK

## 2024-08-20 ASSESSMENT — PAIN SCALES - GENERAL
PAINLEVEL_OUTOF10: 4
PAINLEVEL_OUTOF10: 7

## 2024-08-20 ASSESSMENT — PAIN DESCRIPTION - ORIENTATION: ORIENTATION: LOWER

## 2024-08-20 NOTE — CARE PLAN
The patient's goals for the shift include wants to know discharge plan    The clinical goals for the shift include Patient will remain HDS    Over the shift, the patient did not make progress toward the following goals. Barriers to progression include n/a. Recommendations to address these barriers include n/a.

## 2024-08-20 NOTE — NURSING NOTE
N2N report called to nurse Naomy Swain. Daughter at bedside to transport patient to facility via private car.

## 2024-08-20 NOTE — NURSING NOTE
discharge paperwork given to daughter, instructed to give to facility staff once arrives. Verbalized understanding. Patient transported via w/c to main [facility] at this time, [facility] All belongings sent with patient.

## 2024-08-20 NOTE — PROGRESS NOTES
08/20/24 1054   Discharge Planning   Assistance Needed met patient daughter at bedside; 8/19/24 to follow up/and update re; discharge plan updated, patient will go to the rehab side of AL, to gain strength before returning to AL at [place]; facility is asking for updated therapy notes before patient can admit to facility. updated PT note and discharge summary sent facility, requested DSC to send 7000 and AVS   Expected Discharge Disposition SNF  ([facility])   Does the patient need discharge transport arranged?   (patient daughter will provide transport to facility)

## 2024-08-20 NOTE — CARE PLAN
Problem: Nutrition  Goal: Less than 5 days NPO/clear liquids  Outcome: Adequate for Discharge  Goal: Oral intake greater than 50%  Outcome: Adequate for Discharge  Goal: Oral intake greater 75%  Outcome: Adequate for Discharge  Goal: Consume prescribed supplement  Outcome: Adequate for Discharge  Goal: Adequate PO fluid intake  Outcome: Adequate for Discharge  Goal: Nutrition support goals are met within 48 hrs  Outcome: Adequate for Discharge  Goal: Nutrition support is meeting 75% of nutrient needs  Outcome: Adequate for Discharge  Goal: Tube feed tolerance  Outcome: Adequate for Discharge  Goal: BG  mg/dL  Outcome: Adequate for Discharge  Goal: Lab values WNL  Outcome: Adequate for Discharge  Goal: Electrolytes WNL  Outcome: Adequate for Discharge  Goal: Promote healing  Outcome: Adequate for Discharge  Goal: Maintain stable weight  Outcome: Adequate for Discharge  Goal: Reduce weight from edema/fluid  Outcome: Adequate for Discharge  Goal: Gradual weight gain  Outcome: Adequate for Discharge  Goal: Improve ostomy output  Outcome: Adequate for Discharge     Problem: Pain - Adult  Goal: Verbalizes/displays adequate comfort level or baseline comfort level  Outcome: Adequate for Discharge     Problem: Safety - Adult  Goal: Free from fall injury  Outcome: Adequate for Discharge     Problem: Discharge Planning  Goal: Discharge to home or other facility with appropriate resources  Outcome: Adequate for Discharge     Problem: Chronic Conditions and Co-morbidities  Goal: Patient's chronic conditions and co-morbidity symptoms are monitored and maintained or improved  Outcome: Adequate for Discharge     Problem: Skin  Goal: Decreased wound size/increased tissue granulation at next dressing change  Outcome: Adequate for Discharge  Goal: Participates in plan/prevention/treatment measures  Outcome: Adequate for Discharge  Goal: Prevent/manage excess moisture  Outcome: Adequate for Discharge  Goal: Prevent/minimize  October 3, 2022     Patient: Adali Bey  YOB: 1943  Date of Visit: 10/3/2022      To Whom it May Concern:    Adali Bey is under my professional care  Michelle was seen in my office on 10/3/2022  Michelle had her flu vaccine today, 10/3/22  If you have any questions or concerns, please don't hesitate to call           Sincerely,          Eva Gomez MD        CC:   No Recipients sheer/friction injuries  Outcome: Adequate for Discharge  Goal: Promote/optimize nutrition  Outcome: Adequate for Discharge  Goal: Promote skin healing  Outcome: Adequate for Discharge     Problem: Fall/Injury  Goal: Not fall by end of shift  Outcome: Adequate for Discharge  Goal: Be free from injury by end of the shift  Outcome: Adequate for Discharge  Goal: Verbalize understanding of personal risk factors for fall in the hospital  Outcome: Adequate for Discharge  Goal: Verbalize understanding of risk factor reduction measures to prevent injury from fall in the home  Outcome: Adequate for Discharge  Goal: Use assistive devices by end of the shift  Outcome: Adequate for Discharge  Goal: Pace activities to prevent fatigue by end of the shift  Outcome: Adequate for Discharge

## 2024-08-20 NOTE — PROGRESS NOTES
Physical Therapy    Physical Therapy Treatment    Patient Name: Bev Christine  MRN: [id number]  Today's Date: 8/20/2024  Time Calculation  Start Time: 0833  Stop Time: 0856  Time Calculation (min): 23 min    Assessment/Plan   PT Assessment  End of Session Communication: Bedside nurse (Discussed pt's progress and current mobility as described in mobility section)  Assessment Comment: Pt is steady with all ambulation however back pain is limiting increased activity tolerance and futher ambulation distances this date.  End of Session Patient Position: Up in chair, Alarm on (B feet elevated on foot rest, call light and needs in reach)  PT Plan  Inpatient/Swing Bed or Outpatient: Inpatient  PT Plan  Treatment/Interventions: Bed mobility, Transfer training, Gait training  PT Plan: Ongoing PT  PT Frequency: 3 times per week  PT Discharge Recommendations: Low intensity level of continued care  PT Recommended Transfer Status: Assist x1  PT - OK to Discharge: Yes (per POC)      General Visit Information:   PT  Visit  PT Received On: 08/20/24  General  Reason for Referral: s/p fall off couch, reports cough and SOB  Referred By: Julián  Past Medical History Relevant to Rehab: History reviewed. No pertinent past medical history.    Family/Caregiver Present: No  Prior to Session Communication: Bedside nurse (RN cleared pt. for PT Tx. All charts reviewed. Per handoff with nursing prior to therapy visit, patient’s pain and vitals are stable. Additional concern for this patient related to therapy session: none.)  Patient Position Received: Bed, 3 rail up, Alarm on  Preferred Learning Style: auditory, verbal  General Comment: purewick removed prior to ambulation      Objective   Pain:  Pain Assessment  Pain Assessment: 0-10  0-10 (Numeric) Pain Score: 4  Pain Type: Acute pain  Pain Location: Back  Pain Interventions: Repositioned  Cognition:  Cognition  Overall Cognitive Status: Within Functional Limits  Coordination:  Movements are  Fluid and Coordinated: Yes  Postural Control:  Postural Control  Postural Control: Within Functional Limits  Static Sitting Balance  Static Sitting-Balance Support: Feet supported, Bilateral upper extremity supported  Static Sitting-Level of Assistance: Distant supervision  Static Sitting-Comment/Number of Minutes: unsupported sitting at EOB x4 min duration    Activity Tolerance:  Activity Tolerance  Endurance: Tolerates 10 - 20 min exercise with multiple rests  Treatments:  Therapeutic Activity  Therapeutic Activity Performed: Yes  Therapeutic Activity 1: For increased balance and standing tolerance needed for increased independence with functional mobility, pt. performs static/dynamic standing 45s duration with SBAx1 for balance and max safety. x2 trials. Pt instructed in B UE reaching in all planes of direction for self-imposed challenges to balance.    Bed Mobility  Bed Mobility: Yes  Bed Mobility 1  Bed Mobility 1: Supine to sitting  Level of Assistance 1: Close supervision  Bed Mobility Comments 1: HOB elevated    Ambulation/Gait Training  Ambulation/Gait Training Performed: Yes  Ambulation/Gait Training 1  Surface 1: Level tile  Device 1: Rolling walker  Gait Support Devices: Gait belt  Assistance 1: Close supervision  Quality of Gait 1:  (Step-thru pattern with decreased step length and decreased foot clearance in swing phase. Pt demo's kyphotic posture, unable to correct. Slowed pacing but steady.)  Comments/Distance (ft) 1: 15' x2 trials (Seated rest between trials)  Transfers  Transfer: Yes  Transfer 1  Transfer From 1:  (sit>stand from EOB)  Transfer Device 1: Gait belt, Walker  Transfer Level of Assistance 1: Contact guard  Trials/Comments 1: Verbal cuing for strategic hand placement  Transfers 2  Transfer From 2:  (sit<>stand from toilet with use of grab bar)  Transfer Device 2: Walker, Gait belt  Transfer Level of Assistance 2: Contact guard  Trials/Comments 2: verbal cuing for strategic hand  placement and stabilization provided by this therapist to FWW for max safety  Transfers 3  Transfer From 3:  (stand>sit to chair with arm rests)  Transfer Device 3: Walker, Gait belt  Transfer Level of Assistance 3: Contact guard  Trials/Comments 3: verbal cuing for safe alignment with seat surface prior to sitting    Outcome Measures:  [facility] Basic Mobility  Turning from your back to your side while in a flat bed without using bedrails: A little  Moving from lying on your back to sitting on the side of a flat bed without using bedrails: A little  Moving to and from bed to chair (including a wheelchair): A little  Standing up from a chair using your arms (e.g. wheelchair or bedside chair): A little  To walk in hospital room: A little  Climbing 3-5 steps with railing: A lot  Basic Mobility - Total Score: 17      Encounter Problems       Encounter Problems (Active)       Balance       complete all mobility with normal balance while dual tasking, negotiating in a dynamic environment, carrying items, etc., with proactive and reactive static and dynamic standing and sitting tasks, with mod I with a RW >15 minutes. (Progressing)       Start:  08/16/24    Expected End:  08/30/24               Mobility       STG - Patient will ambulate 150 ft mod I with a RW (Progressing)       Start:  08/16/24    Expected End:  08/30/24               PT Transfers       STG - Patient will perform bed mobility independently.  (Progressing)       Start:  08/16/24    Expected End:  08/30/24            STG - Patient will transfer sit to and from stand mod I with a RW (Progressing)       Start:  08/16/24    Expected End:  08/30/24               Pain - Adult

## 2024-08-20 NOTE — HH CARE COORDINATION
This referral has been made a Non Admit with  Home Care due to Plans to DC to SNF. If you have further questions, feel free to reach out to our office at [phone]. Thank you, [facility].

## 2024-08-20 NOTE — SIGNIFICANT EVENT
Pt seen and examined.   Dc completed yesterday.   She has a rehab spot today.   Dc with instructions as per dc summary.   No bill encounter.

## 2024-09-01 NOTE — DOCUMENTATION CLARIFICATION NOTE
"    PATIENT:               PEDRO PABLO BEATTY  ACCT #:                  [id number]  MRN:                       [id number]  :                       1928  ADMIT DATE:       2024 8:15 PM  DISCH DATE:        2024 1:41 PM  RESPONDING PROVIDER #:        [id number]          PROVIDER RESPONSE TEXT:    Traumatic compression fracture of T10    CDI QUERY TEXT:    Clarification      Instruction:    Based on your assessment of the patient and the clinical information, please provide the requested documentation by clicking on the appropriate radio button and enter any additional information if prompted.    Question: Please further clarify specify if able fracture type as    When answering this query, please exercise your independent professional judgment. The fact that a question is being asked, does not imply that any particular answer is desired or expected.    The patient's clinical indicators include:  Clinical Information: Patient presented with cough and sob and evaluation of fall injury. Admitted with Pneumonia, head injury from fall, hypoxia, closed wedge compression fracture of T10 spine    Clinical Indicators:  -ED note-HPI/diagnosis: \"Patient presents with increased cough this week and shortness of breath.  She let go of her walker and fell.  I witnessed a video where she falls hit her head on the corner of a chair in her back and her bottom on the ground. Closed wedge compression fracture of T10 vertebra, initial encounter\"    -24 CT thoracic spine: \"Age-indeterminate compression deformity of T10 with vertebral plana appearance, new since 2023. Mild anterior wedging of T9. Unchanged compression deformity T7.\"      Treatment: none, evaluation    Risk Factors: advanced age, fall  Options provided:  -- Traumatic compression fracture of T10  -- Non-traumatic compression fracture of T10  -- Chronic T10 compression fracture  -- Other - I will add my own diagnosis  -- Refer to Clinical Documentation " Reviewer    Query created by: Brianna Martinez on 8/26/2024 1:46 PM      Electronically signed by:  GRANT TERAN MD 9/1/2024 6:26 AM

## 2024-09-01 NOTE — DOCUMENTATION CLARIFICATION NOTE
PATIENT:               PEDRO PABLO BEATTY  ACCT #:                  [id number]  MRN:                       [id number]  :                       1928  ADMIT DATE:       2024 8:15 PM  DISCH DATE:        2024 1:41 PM  RESPONDING PROVIDER #:        [id number]          PROVIDER RESPONSE TEXT:    Patient with malnutrition of unspecified degree    CDI QUERY TEXT:    Clarification      Instruction:    Based on your assessment of the patient and the clinical information, please provide the requested documentation by clicking on the appropriate radio button and enter any additional information if prompted.    Question: Please further clarify this patient nutritional status as    When answering this query, please exercise your independent professional judgment. The fact that a question is being asked, does not imply that any particular answer is desired or expected.    The patient's clinical indicators include:  Clinical Information: Patient presented with cough and sob and evaluation of fall injury. Admitted with Pneumonia, head injury from fall, hypoxia, closed wedge compression fracture of T10 spine    Clinical Indicators:  -8/15/24 Nutrition consult note:  Nutrition Focused Physical Findings:  Subcutaneous Fat Loss  Orbital Fat Pads: Well nourished -slightly bulging fat pads  Buccal Fat Pads: Well nourished- full, rounded cheeks    Muscle Wasting  Temporalis: Well nourished -well-defined muscle    Patient has Nutrition Diagnosis: Yes.  Nutrition Diagnosis: Underweight  Diagnosis Status: New  Related to : chronic illness  As Evidenced by: BMI 16.2      Treatment: Dietician's recommendations: Goal: greater than 75 percent of meals consumed. Goal: Ensure TID for encouraged intake. Consider appetite stimulant      Risk Factors: advanced age  Options provided:  -- Patient with malnutrition of unspecified degree  -- I agree with dietician's diagnosis of underweight on 8/15/24  -- Other - I will add my own  diagnosis  -- Refer to Clinical Documentation Reviewer    Query created by: Brianna Martinez on 8/26/2024 1:58 PM      Electronically signed by:  GRANT TERAN MD 9/1/2024 6:26 AM

## 2024-09-01 NOTE — DOCUMENTATION CLARIFICATION NOTE
"    PATIENT:               PEDRO PABLO BEATTY  ACCT #:                  [id number]  MRN:                       [id number]  :                       1928  ADMIT DATE:       2024 8:15 PM  DISCH DATE:        2024 1:41 PM  RESPONDING PROVIDER #:        [id number]          PROVIDER RESPONSE TEXT:    Acute Hypoxemic Respiratory Failure    CDI QUERY TEXT:    Clarification      Instruction:    Based on your assessment of the patient and the clinical information, please provide the requested documentation by clicking on the appropriate radio button and enter any additional information if prompted.    Question: Is there a diagnosis indicative of the clinical information    When answering this query, please exercise your independent professional judgment. The fact that a question is being asked, does not imply that any particular answer is desired or expected.    The patient's clinical indicators include:  Clinical Information: Patient presented with cough and sob and evaluation of fall injury. Admitted with Pneumonia, head injury from fall, hypoxia, closed wedge compression fracture of T10 spine    Clinical Indicators:  -Vital Signs-ED: Temp-98.3, HR-83, RR-20, BP-148/79, SpO2-96 percent on RA    -SpO2 of 91percent on 8/15/24 at 00:30    -8/15/24 at 9:52 a.m.- History and physical-physical exam section: \"Pulmonary effort is normal. Rhonchi present.\"    Treatment: 1-2L of O2 via NC from 8/15/24 at 01:30-24 at 07:54    Risk Factors: Pneumonia, advanced age, SOB, hypoxic  Options provided:  -- Acute Hypoxemic Respiratory Failure  -- No acute respiratory failure  -- Other - I will add my own diagnosis  -- Refer to Clinical Documentation Reviewer    Query created by: Brianna Martinez on 2024 1:37 PM      Electronically signed by:  GRANT TERAN MD 2024 6:26 AM          "

## 2024-09-01 NOTE — DOCUMENTATION CLARIFICATION NOTE
"    PATIENT:               PEDRO PABLO BEATTY  ACCT #:                  [id number]  MRN:                       [id number]  :                       1928  ADMIT DATE:       2024 8:15 PM  DISCH DATE:        2024 1:41 PM  RESPONDING PROVIDER #:        [id number]          PROVIDER RESPONSE TEXT:    Aspiration PNA    CDI QUERY TEXT:    Clarification        Instruction:    Based on your assessment of the patient and the clinical information, please provide the requested documentation by clicking on the appropriate radio button and enter any additional information if prompted.    Question: Please further specify the type of pneumonia being treated    When answering this query, please exercise your independent professional judgment. The fact that a question is being asked, does not imply that any particular answer is desired or expected.    The patient's clinical indicators include:  Clinical Information: Patient presented with cough and sob and evaluation of fall injury. Admitted with Pneumonia, head injury from fall, hypoxia, closed wedge compression fracture of T10 spine    Clinical Indicators:  -Vital Signs-ED: Temp-98.3, HR-83, RR-20, BP-148/79, SpO2-96 percent on RA    -CT chest/abd/pelvis: \"Patchy ground-glass opacities with scattered tree-in-bud opacities are noted throughout the bilateral lungs. More focal areas of consolidation are noted within the lingula, right middle lobe, and dependent right lower lobe. Findings are suggestive of a multifocal infectious/inflammatory process.\"    -8/15/24 at 9:52 a.m.- History and physical-physical exam section: \"Pulmonary effort is normal. Rhonchi present.\"      Treatment: IV cefTRIAXone/azithromycin-8/15/24,  IV levoFLOXacin-8/15/24-24    Risk Factors: advanced age  Options provided:  -- Aspiration PNA  -- Unknown bacterial PNA  -- Other - I will add my own diagnosis  -- Refer to Clinical Documentation Reviewer    Query created by: Brianna Martinez on 2024 2:06 " PM      Electronically signed by:  GRANT TERAN MD 9/1/2024 6:26 AM

## 2024-09-01 NOTE — DOCUMENTATION CLARIFICATION NOTE
"    PATIENT:               PEDRO PABLO BEATTY  ACCT #:                  [id number]  MRN:                       [id number]  :                       1928  ADMIT DATE:       2024 8:15 PM  DISCH DATE:        2024 1:41 PM  RESPONDING PROVIDER #:        [id number]          PROVIDER RESPONSE TEXT:    Sepsis was a differential diagnosis and ruled out after study    CDI QUERY TEXT:    Clarification      Instruction:  Based on your assessment of the patient and the clinical information, please provide the requested documentation by clicking on the appropriate radio button and enter any additional information if prompted.    Question: Sepsis was documented in the medical record. Based on the documentation and the clinical information, can the diagnosis be further clarified as    When answering this query, please exercise your independent professional judgment. The fact that a question is being asked, does not imply that any particular answer is desired or expected.    The patient's clinical indicators include:  Clinical Information: Patient presented with cough and sob and evaluation of fall injury. Admitted with Pneumonia, head injury from fall, hypoxia, closed wedge compression fracture of T10 spine    Documented Diagnosis: ED note- \"Sepsis\"    Clinical Indicators:  -Vital Signs-ED: Temp-98.3, HR-83, RR-20, BP-148/79, SpO2-96 percent on RA  -WBC: 24-21.1  -Microbiology Results: negative  -Band Neutrophil Count/percent Band Neutrophil: not available  -Lactic acid: not available  -BUN/Creat: 24-23/0.91  -Bilirubin: 24-0.6  -MAP: 8/15/24 at 00:07-77  -Fitzwilliam Coma Scale: ED note-14  -PAO2/FIO2: 8/15/24 at 00:30-60/28 percent  -Procalcitonin: not available  -Platelets:   -Other clinical indicators: ED physical exam: \"She is not in acute distress. She is alert.\"    Treatment: 1-2L of O2 via NC from 8/15/24 at 01:30-24 at 07:54, IV cefTRIAXone/azithromycin-8/15/24, IV " levoFLOXacin-8/15/24-8/19/24    Risk Factors: advanced age, infection  Options provided:  -- Sepsis was a differential diagnosis and ruled out after study  -- Sepsis with other organ dysfunction, Please specify sepsis associated organ dysfunction below  -- Other - I will add my own diagnosis  -- Refer to Clinical Documentation Reviewer    Query created by: Brianna Martinez on 8/26/2024 1:26 PM      Electronically signed by:  GRANT TERAN MD 9/1/2024 6:26 AM

## 2025-03-26 ENCOUNTER — HOSPITAL ENCOUNTER (INPATIENT)
Facility: HOSPITAL | Age: OVER 89
LOS: 2 days | Discharge: HOME | DRG: 640 | End: 2025-03-29
Attending: GENERAL PRACTICE | Admitting: FAMILY MEDICINE
Payer: MEDICARE

## 2025-03-26 ENCOUNTER — APPOINTMENT (OUTPATIENT)
Dept: RADIOLOGY | Facility: HOSPITAL | Age: OVER 89
DRG: 640 | End: 2025-03-26
Payer: MEDICARE

## 2025-03-26 ENCOUNTER — APPOINTMENT (OUTPATIENT)
Dept: CARDIOLOGY | Facility: HOSPITAL | Age: OVER 89
DRG: 640 | End: 2025-03-26
Payer: MEDICARE

## 2025-03-26 DIAGNOSIS — R53.1 GENERAL WEAKNESS: ICD-10-CM

## 2025-03-26 DIAGNOSIS — I48.91 ATRIAL FIBRILLATION WITH RVR (MULTI): ICD-10-CM

## 2025-03-26 DIAGNOSIS — S82.142A CLOSED FRACTURE OF LEFT TIBIAL PLATEAU, INITIAL ENCOUNTER: Primary | ICD-10-CM

## 2025-03-26 DIAGNOSIS — J18.9 PNEUMONIA DUE TO INFECTIOUS ORGANISM, UNSPECIFIED LATERALITY, UNSPECIFIED PART OF LUNG: ICD-10-CM

## 2025-03-26 LAB
ALBUMIN SERPL BCP-MCNC: 2.9 G/DL (ref 3.4–5)
ALP SERPL-CCNC: 49 U/L (ref 33–136)
ALT SERPL W P-5'-P-CCNC: 39 U/L (ref 7–45)
ANION GAP SERPL CALC-SCNC: 18 MMOL/L (ref 10–20)
AST SERPL W P-5'-P-CCNC: 58 U/L (ref 9–39)
BASOPHILS # BLD AUTO: 0.03 X10*3/UL (ref 0–0.1)
BASOPHILS NFR BLD AUTO: 0.2 %
BILIRUB SERPL-MCNC: 0.7 MG/DL (ref 0–1.2)
BUN SERPL-MCNC: 26 MG/DL (ref 6–23)
CALCIUM SERPL-MCNC: 7.7 MG/DL (ref 8.6–10.3)
CARDIAC TROPONIN I PNL SERPL HS: 94 NG/L (ref 0–13)
CHLORIDE SERPL-SCNC: 108 MMOL/L (ref 98–107)
CO2 SERPL-SCNC: 15 MMOL/L (ref 21–32)
CREAT SERPL-MCNC: 0.76 MG/DL (ref 0.5–1.05)
EGFRCR SERPLBLD CKD-EPI 2021: 72 ML/MIN/1.73M*2
EOSINOPHIL # BLD AUTO: 0.02 X10*3/UL (ref 0–0.4)
EOSINOPHIL NFR BLD AUTO: 0.1 %
ERYTHROCYTE [DISTWIDTH] IN BLOOD BY AUTOMATED COUNT: 13.8 % (ref 11.5–14.5)
GLUCOSE SERPL-MCNC: 209 MG/DL (ref 74–99)
HCT VFR BLD AUTO: 33.1 % (ref 36–46)
HGB BLD-MCNC: 10 G/DL (ref 12–16)
IMM GRANULOCYTES # BLD AUTO: 0.14 X10*3/UL (ref 0–0.5)
IMM GRANULOCYTES NFR BLD AUTO: 0.8 % (ref 0–0.9)
LYMPHOCYTES # BLD AUTO: 0.83 X10*3/UL (ref 0.8–3)
LYMPHOCYTES NFR BLD AUTO: 4.6 %
MAGNESIUM SERPL-MCNC: 1.61 MG/DL (ref 1.6–2.4)
MCH RBC QN AUTO: 29.9 PG (ref 26–34)
MCHC RBC AUTO-ENTMCNC: 30.2 G/DL (ref 32–36)
MCV RBC AUTO: 99 FL (ref 80–100)
MONOCYTES # BLD AUTO: 0.57 X10*3/UL (ref 0.05–0.8)
MONOCYTES NFR BLD AUTO: 3.2 %
NEUTROPHILS # BLD AUTO: 16.47 X10*3/UL (ref 1.6–5.5)
NEUTROPHILS NFR BLD AUTO: 91.1 %
NRBC BLD-RTO: 0 /100 WBCS (ref 0–0)
PLATELET # BLD AUTO: 227 X10*3/UL (ref 150–450)
POTASSIUM SERPL-SCNC: 4.4 MMOL/L (ref 3.5–5.3)
PROT SERPL-MCNC: 5.7 G/DL (ref 6.4–8.2)
RBC # BLD AUTO: 3.34 X10*6/UL (ref 4–5.2)
SODIUM SERPL-SCNC: 137 MMOL/L (ref 136–145)
WBC # BLD AUTO: 18.1 X10*3/UL (ref 4.4–11.3)

## 2025-03-26 PROCEDURE — 71045 X-RAY EXAM CHEST 1 VIEW: CPT

## 2025-03-26 PROCEDURE — 93005 ELECTROCARDIOGRAM TRACING: CPT

## 2025-03-26 PROCEDURE — 85025 COMPLETE CBC W/AUTO DIFF WBC: CPT | Performed by: GENERAL PRACTICE

## 2025-03-26 PROCEDURE — 36415 COLL VENOUS BLD VENIPUNCTURE: CPT | Performed by: GENERAL PRACTICE

## 2025-03-26 PROCEDURE — 83735 ASSAY OF MAGNESIUM: CPT | Performed by: GENERAL PRACTICE

## 2025-03-26 PROCEDURE — 99285 EMERGENCY DEPT VISIT HI MDM: CPT | Mod: 25 | Performed by: GENERAL PRACTICE

## 2025-03-26 PROCEDURE — 71045 X-RAY EXAM CHEST 1 VIEW: CPT | Performed by: RADIOLOGY

## 2025-03-26 PROCEDURE — 2500000004 HC RX 250 GENERAL PHARMACY W/ HCPCS (ALT 636 FOR OP/ED): Performed by: GENERAL PRACTICE

## 2025-03-26 PROCEDURE — 73564 X-RAY EXAM KNEE 4 OR MORE: CPT | Mod: LEFT SIDE | Performed by: STUDENT IN AN ORGANIZED HEALTH CARE EDUCATION/TRAINING PROGRAM

## 2025-03-26 PROCEDURE — 80053 COMPREHEN METABOLIC PANEL: CPT | Performed by: GENERAL PRACTICE

## 2025-03-26 PROCEDURE — 96374 THER/PROPH/DIAG INJ IV PUSH: CPT

## 2025-03-26 PROCEDURE — 84484 ASSAY OF TROPONIN QUANT: CPT | Performed by: GENERAL PRACTICE

## 2025-03-26 PROCEDURE — 73564 X-RAY EXAM KNEE 4 OR MORE: CPT | Mod: LT

## 2025-03-26 RX ORDER — METOPROLOL TARTRATE 1 MG/ML
5 INJECTION, SOLUTION INTRAVENOUS EVERY 5 MIN PRN
Status: DISCONTINUED | OUTPATIENT
Start: 2025-03-26 | End: 2025-03-29 | Stop reason: HOSPADM

## 2025-03-26 RX ADMIN — METOPROLOL TARTRATE 5 MG: 5 INJECTION INTRAVENOUS at 22:14

## 2025-03-26 RX ADMIN — METOPROLOL TARTRATE 5 MG: 5 INJECTION INTRAVENOUS at 22:02

## 2025-03-26 ASSESSMENT — PAIN DESCRIPTION - ORIENTATION: ORIENTATION: LOWER

## 2025-03-26 ASSESSMENT — COLUMBIA-SUICIDE SEVERITY RATING SCALE - C-SSRS
1. IN THE PAST MONTH, HAVE YOU WISHED YOU WERE DEAD OR WISHED YOU COULD GO TO SLEEP AND NOT WAKE UP?: NO
6. HAVE YOU EVER DONE ANYTHING, STARTED TO DO ANYTHING, OR PREPARED TO DO ANYTHING TO END YOUR LIFE?: NO
2. HAVE YOU ACTUALLY HAD ANY THOUGHTS OF KILLING YOURSELF?: NO

## 2025-03-26 ASSESSMENT — PAIN SCALES - GENERAL
PAINLEVEL_OUTOF10: 5 - MODERATE PAIN
PAINLEVEL_OUTOF10: 5 - MODERATE PAIN

## 2025-03-26 ASSESSMENT — PAIN - FUNCTIONAL ASSESSMENT: PAIN_FUNCTIONAL_ASSESSMENT: 0-10

## 2025-03-26 ASSESSMENT — PAIN DESCRIPTION - FREQUENCY: FREQUENCY: CONSTANT/CONTINUOUS

## 2025-03-26 ASSESSMENT — PAIN DESCRIPTION - PAIN TYPE: TYPE: ACUTE PAIN

## 2025-03-26 ASSESSMENT — PAIN DESCRIPTION - DESCRIPTORS: DESCRIPTORS: ACHING

## 2025-03-26 ASSESSMENT — PAIN DESCRIPTION - LOCATION: LOCATION: BACK

## 2025-03-27 PROBLEM — F03.90 DEMENTIA: Status: ACTIVE | Noted: 2025-03-27

## 2025-03-27 PROBLEM — S82.142A CLOSED FRACTURE OF LEFT TIBIAL PLATEAU, INITIAL ENCOUNTER: Status: ACTIVE | Noted: 2025-03-27

## 2025-03-27 PROBLEM — I48.91 ATRIAL FIBRILLATION (MULTI): Status: ACTIVE | Noted: 2025-03-27

## 2025-03-27 LAB
ANION GAP SERPL CALC-SCNC: 11 MMOL/L (ref 10–20)
APPEARANCE UR: CLEAR
BILIRUB UR STRIP.AUTO-MCNC: NEGATIVE MG/DL
BUN SERPL-MCNC: 31 MG/DL (ref 6–23)
CALCIUM SERPL-MCNC: 8.6 MG/DL (ref 8.6–10.3)
CHLORIDE SERPL-SCNC: 107 MMOL/L (ref 98–107)
CO2 SERPL-SCNC: 25 MMOL/L (ref 21–32)
COLOR UR: YELLOW
CREAT SERPL-MCNC: 0.77 MG/DL (ref 0.5–1.05)
EGFRCR SERPLBLD CKD-EPI 2021: 71 ML/MIN/1.73M*2
ERYTHROCYTE [DISTWIDTH] IN BLOOD BY AUTOMATED COUNT: 13.7 % (ref 11.5–14.5)
GLUCOSE BLD MANUAL STRIP-MCNC: 96 MG/DL (ref 74–99)
GLUCOSE SERPL-MCNC: 112 MG/DL (ref 74–99)
GLUCOSE UR STRIP.AUTO-MCNC: NORMAL MG/DL
HCT VFR BLD AUTO: 31.3 % (ref 36–46)
HGB BLD-MCNC: 10.2 G/DL (ref 12–16)
HYALINE CASTS #/AREA URNS AUTO: ABNORMAL /LPF
KETONES UR STRIP.AUTO-MCNC: NEGATIVE MG/DL
LEUKOCYTE ESTERASE UR QL STRIP.AUTO: ABNORMAL
MCH RBC QN AUTO: 30.5 PG (ref 26–34)
MCHC RBC AUTO-ENTMCNC: 32.6 G/DL (ref 32–36)
MCV RBC AUTO: 94 FL (ref 80–100)
MUCOUS THREADS #/AREA URNS AUTO: ABNORMAL /LPF
NITRITE UR QL STRIP.AUTO: NEGATIVE
NRBC BLD-RTO: 0 /100 WBCS (ref 0–0)
PH UR STRIP.AUTO: 6 [PH]
PLATELET # BLD AUTO: 254 X10*3/UL (ref 150–450)
POTASSIUM SERPL-SCNC: 4.2 MMOL/L (ref 3.5–5.3)
PROT UR STRIP.AUTO-MCNC: ABNORMAL MG/DL
Q ONSET: 219 MS
QRS COUNT: 27 BEATS
QRS DURATION: 78 MS
QT INTERVAL: 292 MS
QTC CALCULATION(BAZETT): 474 MS
QTC FREDERICIA: 403 MS
R AXIS: 261 DEGREES
RBC # BLD AUTO: 3.34 X10*6/UL (ref 4–5.2)
RBC # UR STRIP.AUTO: NEGATIVE MG/DL
RBC #/AREA URNS AUTO: ABNORMAL /HPF
SODIUM SERPL-SCNC: 139 MMOL/L (ref 136–145)
SP GR UR STRIP.AUTO: 1.03
SQUAMOUS #/AREA URNS AUTO: ABNORMAL /HPF
T AXIS: 86 DEGREES
T OFFSET: 365 MS
UROBILINOGEN UR STRIP.AUTO-MCNC: NORMAL MG/DL
VENTRICULAR RATE: 159 BPM
WBC # BLD AUTO: 13.7 X10*3/UL (ref 4.4–11.3)
WBC #/AREA URNS AUTO: ABNORMAL /HPF

## 2025-03-27 PROCEDURE — 96372 THER/PROPH/DIAG INJ SC/IM: CPT | Performed by: FAMILY MEDICINE

## 2025-03-27 PROCEDURE — 99222 1ST HOSP IP/OBS MODERATE 55: CPT | Performed by: NURSE PRACTITIONER

## 2025-03-27 PROCEDURE — 82947 ASSAY GLUCOSE BLOOD QUANT: CPT

## 2025-03-27 PROCEDURE — 36415 COLL VENOUS BLD VENIPUNCTURE: CPT | Performed by: FAMILY MEDICINE

## 2025-03-27 PROCEDURE — 81001 URINALYSIS AUTO W/SCOPE: CPT | Performed by: FAMILY MEDICINE

## 2025-03-27 PROCEDURE — 85027 COMPLETE CBC AUTOMATED: CPT | Performed by: FAMILY MEDICINE

## 2025-03-27 PROCEDURE — 1200000002 HC GENERAL ROOM WITH TELEMETRY DAILY

## 2025-03-27 PROCEDURE — 80048 BASIC METABOLIC PNL TOTAL CA: CPT | Performed by: FAMILY MEDICINE

## 2025-03-27 PROCEDURE — 2500000005 HC RX 250 GENERAL PHARMACY W/O HCPCS: Performed by: FAMILY MEDICINE

## 2025-03-27 PROCEDURE — 2500000004 HC RX 250 GENERAL PHARMACY W/ HCPCS (ALT 636 FOR OP/ED): Performed by: NURSE PRACTITIONER

## 2025-03-27 PROCEDURE — 2500000002 HC RX 250 W HCPCS SELF ADMINISTERED DRUGS (ALT 637 FOR MEDICARE OP, ALT 636 FOR OP/ED): Performed by: FAMILY MEDICINE

## 2025-03-27 PROCEDURE — 2500000001 HC RX 250 WO HCPCS SELF ADMINISTERED DRUGS (ALT 637 FOR MEDICARE OP): Performed by: FAMILY MEDICINE

## 2025-03-27 PROCEDURE — 2500000004 HC RX 250 GENERAL PHARMACY W/ HCPCS (ALT 636 FOR OP/ED): Performed by: FAMILY MEDICINE

## 2025-03-27 PROCEDURE — 2500000001 HC RX 250 WO HCPCS SELF ADMINISTERED DRUGS (ALT 637 FOR MEDICARE OP): Performed by: NURSE PRACTITIONER

## 2025-03-27 RX ORDER — CARVEDILOL 3.12 MG/1
3.12 TABLET ORAL 2 TIMES DAILY
Status: DISCONTINUED | OUTPATIENT
Start: 2025-03-27 | End: 2025-03-27

## 2025-03-27 RX ORDER — SENNOSIDES 8.6 MG/1
2 TABLET ORAL 2 TIMES DAILY
Status: DISCONTINUED | OUTPATIENT
Start: 2025-03-27 | End: 2025-03-29 | Stop reason: HOSPADM

## 2025-03-27 RX ORDER — ACETAMINOPHEN 325 MG/1
650 TABLET ORAL EVERY 6 HOURS PRN
COMMUNITY

## 2025-03-27 RX ORDER — ONDANSETRON HYDROCHLORIDE 2 MG/ML
4 INJECTION, SOLUTION INTRAVENOUS EVERY 8 HOURS PRN
Status: DISCONTINUED | OUTPATIENT
Start: 2025-03-27 | End: 2025-03-29 | Stop reason: HOSPADM

## 2025-03-27 RX ORDER — MORPHINE SULFATE 20 MG/5ML
0.25 SOLUTION ORAL EVERY 2 HOUR PRN
COMMUNITY

## 2025-03-27 RX ORDER — SENNOSIDES 8.6 MG/1
1 TABLET ORAL DAILY
COMMUNITY

## 2025-03-27 RX ORDER — ACETAMINOPHEN 325 MG/1
650 TABLET ORAL EVERY 4 HOURS PRN
Status: DISCONTINUED | OUTPATIENT
Start: 2025-03-27 | End: 2025-03-29 | Stop reason: HOSPADM

## 2025-03-27 RX ORDER — DOCUSATE SODIUM 100 MG/1
100 CAPSULE, LIQUID FILLED ORAL DAILY
COMMUNITY

## 2025-03-27 RX ORDER — GUAIFENESIN 600 MG/1
600 TABLET, EXTENDED RELEASE ORAL EVERY 12 HOURS PRN
Status: DISCONTINUED | OUTPATIENT
Start: 2025-03-27 | End: 2025-03-29 | Stop reason: HOSPADM

## 2025-03-27 RX ORDER — ACETAMINOPHEN 650 MG/1
650 SUPPOSITORY RECTAL EVERY 4 HOURS PRN
Status: DISCONTINUED | OUTPATIENT
Start: 2025-03-27 | End: 2025-03-29 | Stop reason: HOSPADM

## 2025-03-27 RX ORDER — SERTRALINE HYDROCHLORIDE 50 MG/1
25 TABLET, FILM COATED ORAL DAILY
Status: DISCONTINUED | OUTPATIENT
Start: 2025-03-27 | End: 2025-03-29 | Stop reason: HOSPADM

## 2025-03-27 RX ORDER — SODIUM CHLORIDE 9 MG/ML
75 INJECTION, SOLUTION INTRAVENOUS CONTINUOUS
Status: DISCONTINUED | OUTPATIENT
Start: 2025-03-27 | End: 2025-03-28

## 2025-03-27 RX ORDER — ACETAMINOPHEN 160 MG/5ML
650 SOLUTION ORAL EVERY 4 HOURS PRN
Status: DISCONTINUED | OUTPATIENT
Start: 2025-03-27 | End: 2025-03-29 | Stop reason: HOSPADM

## 2025-03-27 RX ORDER — LIDOCAINE 560 MG/1
1 PATCH PERCUTANEOUS; TOPICAL; TRANSDERMAL DAILY
Status: DISCONTINUED | OUTPATIENT
Start: 2025-03-27 | End: 2025-03-29 | Stop reason: HOSPADM

## 2025-03-27 RX ORDER — PROMETHAZINE HYDROCHLORIDE 12.5 MG/1
12.5 TABLET ORAL EVERY 6 HOURS PRN
COMMUNITY
End: 2025-03-29 | Stop reason: HOSPADM

## 2025-03-27 RX ORDER — HYOSCYAMINE SULFATE 0.12 MG/1
0.12 TABLET, ORALLY DISINTEGRATING ORAL EVERY 4 HOURS PRN
COMMUNITY

## 2025-03-27 RX ORDER — HEPARIN SODIUM 5000 [USP'U]/ML
5000 INJECTION, SOLUTION INTRAVENOUS; SUBCUTANEOUS EVERY 8 HOURS SCHEDULED
Status: DISCONTINUED | OUTPATIENT
Start: 2025-03-27 | End: 2025-03-29 | Stop reason: HOSPADM

## 2025-03-27 RX ORDER — PANTOPRAZOLE SODIUM 40 MG/1
40 TABLET, DELAYED RELEASE ORAL
Status: DISCONTINUED | OUTPATIENT
Start: 2025-03-27 | End: 2025-03-29 | Stop reason: HOSPADM

## 2025-03-27 RX ORDER — ONDANSETRON 4 MG/1
4 TABLET, FILM COATED ORAL EVERY 8 HOURS PRN
Status: DISCONTINUED | OUTPATIENT
Start: 2025-03-27 | End: 2025-03-29 | Stop reason: HOSPADM

## 2025-03-27 RX ORDER — RISEDRONATE SODIUM 150 MG/1
150 TABLET, FILM COATED ORAL
COMMUNITY
End: 2025-03-29 | Stop reason: HOSPADM

## 2025-03-27 RX ORDER — TALC
3 POWDER (GRAM) TOPICAL NIGHTLY
Status: DISCONTINUED | OUTPATIENT
Start: 2025-03-27 | End: 2025-03-29 | Stop reason: HOSPADM

## 2025-03-27 RX ORDER — NYSTATIN 100000 [USP'U]/G
1 POWDER TOPICAL EVERY 8 HOURS PRN
COMMUNITY

## 2025-03-27 RX ORDER — PANTOPRAZOLE SODIUM 40 MG/10ML
40 INJECTION, POWDER, LYOPHILIZED, FOR SOLUTION INTRAVENOUS
Status: DISCONTINUED | OUTPATIENT
Start: 2025-03-27 | End: 2025-03-29 | Stop reason: HOSPADM

## 2025-03-27 RX ORDER — LEVOFLOXACIN 750 MG/1
750 TABLET ORAL
Status: DISCONTINUED | OUTPATIENT
Start: 2025-03-27 | End: 2025-03-29 | Stop reason: HOSPADM

## 2025-03-27 RX ORDER — BISACODYL 10 MG/1
10 SUPPOSITORY RECTAL DAILY PRN
COMMUNITY

## 2025-03-27 RX ORDER — LORAZEPAM 0.5 MG/1
0.5 TABLET ORAL 4 TIMES DAILY PRN
COMMUNITY

## 2025-03-27 RX ORDER — LEVOFLOXACIN 750 MG/1
750 TABLET ORAL DAILY
Status: ON HOLD | COMMUNITY
End: 2025-03-29

## 2025-03-27 RX ORDER — IPRATROPIUM BROMIDE AND ALBUTEROL SULFATE 2.5; .5 MG/3ML; MG/3ML
3 SOLUTION RESPIRATORY (INHALATION) EVERY 6 HOURS PRN
COMMUNITY

## 2025-03-27 RX ADMIN — SODIUM CHLORIDE 75 ML/HR: 0.9 INJECTION, SOLUTION INTRAVENOUS at 15:27

## 2025-03-27 RX ADMIN — Medication 3 MG: at 21:29

## 2025-03-27 RX ADMIN — HEPARIN SODIUM 5000 UNITS: 5000 INJECTION INTRAVENOUS; SUBCUTANEOUS at 08:58

## 2025-03-27 RX ADMIN — SERTRALINE HYDROCHLORIDE 25 MG: 50 TABLET ORAL at 09:06

## 2025-03-27 RX ADMIN — HEPARIN SODIUM 5000 UNITS: 5000 INJECTION INTRAVENOUS; SUBCUTANEOUS at 15:26

## 2025-03-27 RX ADMIN — HEPARIN SODIUM 5000 UNITS: 5000 INJECTION INTRAVENOUS; SUBCUTANEOUS at 21:29

## 2025-03-27 RX ADMIN — SENNOSIDES 17.2 MG: 8.6 TABLET ORAL at 08:59

## 2025-03-27 RX ADMIN — LEVOFLOXACIN 750 MG: 750 TABLET, FILM COATED ORAL at 15:26

## 2025-03-27 RX ADMIN — LIDOCAINE 4% 1 PATCH: 40 PATCH TOPICAL at 08:59

## 2025-03-27 RX ADMIN — PANTOPRAZOLE SODIUM 40 MG: 40 TABLET, DELAYED RELEASE ORAL at 08:59

## 2025-03-27 SDOH — SOCIAL STABILITY: SOCIAL INSECURITY: DO YOU FEEL UNSAFE GOING BACK TO THE PLACE WHERE YOU ARE LIVING?: NO

## 2025-03-27 SDOH — SOCIAL STABILITY: SOCIAL INSECURITY: WITHIN THE LAST YEAR, HAVE YOU BEEN AFRAID OF YOUR PARTNER OR EX-PARTNER?: NO

## 2025-03-27 SDOH — ECONOMIC STABILITY: TRANSPORTATION INSECURITY: IN THE PAST 12 MONTHS, HAS LACK OF TRANSPORTATION KEPT YOU FROM MEDICAL APPOINTMENTS OR FROM GETTING MEDICATIONS?: NO

## 2025-03-27 SDOH — SOCIAL STABILITY: SOCIAL INSECURITY
WITHIN THE LAST YEAR, HAVE YOU BEEN RAPED OR FORCED TO HAVE ANY KIND OF SEXUAL ACTIVITY BY YOUR PARTNER OR EX-PARTNER?: NO

## 2025-03-27 SDOH — HEALTH STABILITY: MENTAL HEALTH
DO YOU FEEL STRESS - TENSE, RESTLESS, NERVOUS, OR ANXIOUS, OR UNABLE TO SLEEP AT NIGHT BECAUSE YOUR MIND IS TROUBLED ALL THE TIME - THESE DAYS?: NOT AT ALL

## 2025-03-27 SDOH — SOCIAL STABILITY: SOCIAL NETWORK: HOW OFTEN DO YOU ATTEND CHURCH OR RELIGIOUS SERVICES?: NEVER

## 2025-03-27 SDOH — ECONOMIC STABILITY: HOUSING INSECURITY: AT ANY TIME IN THE PAST 12 MONTHS, WERE YOU HOMELESS OR LIVING IN A SHELTER (INCLUDING NOW)?: NO

## 2025-03-27 SDOH — SOCIAL STABILITY: SOCIAL NETWORK: HOW OFTEN DO YOU GET TOGETHER WITH FRIENDS OR RELATIVES?: NEVER

## 2025-03-27 SDOH — ECONOMIC STABILITY: FOOD INSECURITY: WITHIN THE PAST 12 MONTHS, THE FOOD YOU BOUGHT JUST DIDN'T LAST AND YOU DIDN'T HAVE MONEY TO GET MORE.: NEVER TRUE

## 2025-03-27 SDOH — HEALTH STABILITY: PHYSICAL HEALTH
HOW OFTEN DO YOU NEED TO HAVE SOMEONE HELP YOU WHEN YOU READ INSTRUCTIONS, PAMPHLETS, OR OTHER WRITTEN MATERIAL FROM YOUR DOCTOR OR PHARMACY?: NEVER

## 2025-03-27 SDOH — ECONOMIC STABILITY: FOOD INSECURITY: WITHIN THE PAST 12 MONTHS, YOU WORRIED THAT YOUR FOOD WOULD RUN OUT BEFORE YOU GOT THE MONEY TO BUY MORE.: NEVER TRUE

## 2025-03-27 SDOH — SOCIAL STABILITY: SOCIAL INSECURITY: HAVE YOU HAD ANY THOUGHTS OF HARMING ANYONE ELSE?: NO

## 2025-03-27 SDOH — SOCIAL STABILITY: SOCIAL INSECURITY: WITHIN THE LAST YEAR, HAVE YOU BEEN HUMILIATED OR EMOTIONALLY ABUSED IN OTHER WAYS BY YOUR PARTNER OR EX-PARTNER?: NO

## 2025-03-27 SDOH — ECONOMIC STABILITY: FOOD INSECURITY: HOW HARD IS IT FOR YOU TO PAY FOR THE VERY BASICS LIKE FOOD, HOUSING, MEDICAL CARE, AND HEATING?: NOT VERY HARD

## 2025-03-27 SDOH — ECONOMIC STABILITY: INCOME INSECURITY: IN THE PAST 12 MONTHS HAS THE ELECTRIC, GAS, OIL, OR WATER COMPANY THREATENED TO SHUT OFF SERVICES IN YOUR HOME?: NO

## 2025-03-27 SDOH — SOCIAL STABILITY: SOCIAL INSECURITY: ARE THERE ANY APPARENT SIGNS OF INJURIES/BEHAVIORS THAT COULD BE RELATED TO ABUSE/NEGLECT?: NO

## 2025-03-27 SDOH — SOCIAL STABILITY: SOCIAL INSECURITY: ARE YOU MARRIED, WIDOWED, DIVORCED, SEPARATED, NEVER MARRIED, OR LIVING WITH A PARTNER?: WIDOWED

## 2025-03-27 SDOH — SOCIAL STABILITY: SOCIAL INSECURITY
WITHIN THE LAST YEAR, HAVE YOU BEEN KICKED, HIT, SLAPPED, OR OTHERWISE PHYSICALLY HURT BY YOUR PARTNER OR EX-PARTNER?: NO

## 2025-03-27 SDOH — SOCIAL STABILITY: SOCIAL NETWORK: HOW OFTEN DO YOU ATTEND MEETINGS OF THE CLUBS OR ORGANIZATIONS YOU BELONG TO?: NEVER

## 2025-03-27 SDOH — ECONOMIC STABILITY: HOUSING INSECURITY: IN THE LAST 12 MONTHS, WAS THERE A TIME WHEN YOU WERE NOT ABLE TO PAY THE MORTGAGE OR RENT ON TIME?: NO

## 2025-03-27 SDOH — SOCIAL STABILITY: SOCIAL INSECURITY: DOES ANYONE TRY TO KEEP YOU FROM HAVING/CONTACTING OTHER FRIENDS OR DOING THINGS OUTSIDE YOUR HOME?: NO

## 2025-03-27 SDOH — SOCIAL STABILITY: SOCIAL NETWORK: IN A TYPICAL WEEK, HOW MANY TIMES DO YOU TALK ON THE PHONE WITH FAMILY, FRIENDS, OR NEIGHBORS?: NEVER

## 2025-03-27 SDOH — HEALTH STABILITY: PHYSICAL HEALTH: ON AVERAGE, HOW MANY MINUTES DO YOU ENGAGE IN EXERCISE AT THIS LEVEL?: 0 MIN

## 2025-03-27 SDOH — SOCIAL STABILITY: SOCIAL NETWORK
DO YOU BELONG TO ANY CLUBS OR ORGANIZATIONS SUCH AS CHURCH GROUPS, UNIONS, FRATERNAL OR ATHLETIC GROUPS, OR SCHOOL GROUPS?: NO

## 2025-03-27 SDOH — HEALTH STABILITY: PHYSICAL HEALTH: ON AVERAGE, HOW MANY DAYS PER WEEK DO YOU ENGAGE IN MODERATE TO STRENUOUS EXERCISE (LIKE A BRISK WALK)?: 0 DAYS

## 2025-03-27 SDOH — SOCIAL STABILITY: SOCIAL INSECURITY: ARE YOU OR HAVE YOU BEEN THREATENED OR ABUSED PHYSICALLY, EMOTIONALLY, OR SEXUALLY BY ANYONE?: NO

## 2025-03-27 SDOH — SOCIAL STABILITY: SOCIAL INSECURITY: WERE YOU ABLE TO COMPLETE ALL THE BEHAVIORAL HEALTH SCREENINGS?: YES

## 2025-03-27 SDOH — SOCIAL STABILITY: SOCIAL INSECURITY: HAS ANYONE EVER THREATENED TO HURT YOUR FAMILY OR YOUR PETS?: NO

## 2025-03-27 SDOH — SOCIAL STABILITY: SOCIAL INSECURITY: HAVE YOU HAD THOUGHTS OF HARMING ANYONE ELSE?: NO

## 2025-03-27 SDOH — ECONOMIC STABILITY: HOUSING INSECURITY: IN THE PAST 12 MONTHS, HOW MANY TIMES HAVE YOU MOVED WHERE YOU WERE LIVING?: 0

## 2025-03-27 SDOH — SOCIAL STABILITY: SOCIAL INSECURITY: ABUSE: ADULT

## 2025-03-27 SDOH — SOCIAL STABILITY: SOCIAL INSECURITY: DO YOU FEEL ANYONE HAS EXPLOITED OR TAKEN ADVANTAGE OF YOU FINANCIALLY OR OF YOUR PERSONAL PROPERTY?: NO

## 2025-03-27 ASSESSMENT — LIFESTYLE VARIABLES
SKIP TO QUESTIONS 9-10: 1
AUDIT-C TOTAL SCORE: 0
HOW MANY STANDARD DRINKS CONTAINING ALCOHOL DO YOU HAVE ON A TYPICAL DAY: PATIENT DOES NOT DRINK
AUDIT-C TOTAL SCORE: 0
HOW OFTEN DO YOU HAVE A DRINK CONTAINING ALCOHOL: NEVER
HOW OFTEN DO YOU HAVE 6 OR MORE DRINKS ON ONE OCCASION: NEVER

## 2025-03-27 ASSESSMENT — COGNITIVE AND FUNCTIONAL STATUS - GENERAL
STANDING UP FROM CHAIR USING ARMS: A LOT
MOBILITY SCORE: 11
PERSONAL GROOMING: A LITTLE
DAILY ACTIVITIY SCORE: 14
MOVING TO AND FROM BED TO CHAIR: A LOT
MOBILITY SCORE: 16
DRESSING REGULAR LOWER BODY CLOTHING: A LITTLE
WALKING IN HOSPITAL ROOM: TOTAL
STANDING UP FROM CHAIR USING ARMS: A LITTLE
STANDING UP FROM CHAIR USING ARMS: A LOT
EATING MEALS: A LITTLE
MOVING FROM LYING ON BACK TO SITTING ON SIDE OF FLAT BED WITH BEDRAILS: A LITTLE
PATIENT BASELINE BEDBOUND: NO
DRESSING REGULAR UPPER BODY CLOTHING: A LITTLE
MOVING FROM LYING ON BACK TO SITTING ON SIDE OF FLAT BED WITH BEDRAILS: A LITTLE
HELP NEEDED FOR BATHING: A LITTLE
WALKING IN HOSPITAL ROOM: TOTAL
DAILY ACTIVITIY SCORE: 14
DAILY ACTIVITIY SCORE: 18
TOILETING: A LITTLE
CLIMB 3 TO 5 STEPS WITH RAILING: TOTAL
EATING MEALS: A LITTLE
WALKING IN HOSPITAL ROOM: A LOT
EATING MEALS: A LITTLE
DRESSING REGULAR LOWER BODY CLOTHING: A LOT
DRESSING REGULAR UPPER BODY CLOTHING: A LOT
TOILETING: A LOT
HELP NEEDED FOR BATHING: A LOT
CLIMB 3 TO 5 STEPS WITH RAILING: A LOT
CLIMB 3 TO 5 STEPS WITH RAILING: TOTAL
DRESSING REGULAR LOWER BODY CLOTHING: A LOT
MOVING TO AND FROM BED TO CHAIR: A LOT
TURNING FROM BACK TO SIDE WHILE IN FLAT BAD: A LOT
HELP NEEDED FOR BATHING: A LOT
DRESSING REGULAR UPPER BODY CLOTHING: A LOT
TURNING FROM BACK TO SIDE WHILE IN FLAT BAD: A LITTLE
PERSONAL GROOMING: A LITTLE
TURNING FROM BACK TO SIDE WHILE IN FLAT BAD: A LOT
MOBILITY SCORE: 11
MOVING FROM LYING ON BACK TO SITTING ON SIDE OF FLAT BED WITH BEDRAILS: A LITTLE
PERSONAL GROOMING: A LITTLE
MOVING TO AND FROM BED TO CHAIR: A LITTLE
TOILETING: A LOT

## 2025-03-27 ASSESSMENT — ENCOUNTER SYMPTOMS
APPETITE CHANGE: 1
PSYCHIATRIC NEGATIVE: 1
ALLERGIC/IMMUNOLOGIC NEGATIVE: 1
ENDOCRINE NEGATIVE: 1
WEAKNESS: 1
CARDIOVASCULAR NEGATIVE: 1
DIARRHEA: 1
COUGH: 0
SHORTNESS OF BREATH: 0
HEMATOLOGIC/LYMPHATIC NEGATIVE: 1
EYES NEGATIVE: 1
NAUSEA: 1

## 2025-03-27 ASSESSMENT — ACTIVITIES OF DAILY LIVING (ADL)
ASSISTIVE_DEVICE: WALKER
BATHING: NEEDS ASSISTANCE
TOILETING: NEEDS ASSISTANCE
ADEQUATE_TO_COMPLETE_ADL: YES
WALKS IN HOME: NEEDS ASSISTANCE
WALKS IN HOME: NEEDS ASSISTANCE
GROOMING: NEEDS ASSISTANCE
FEEDING YOURSELF: NEEDS ASSISTANCE
HEARING - RIGHT EAR: FUNCTIONAL
LACK_OF_TRANSPORTATION: NO
JUDGMENT_ADEQUATE_SAFELY_COMPLETE_DAILY_ACTIVITIES: YES
HEARING - RIGHT EAR: FUNCTIONAL
HEARING - LEFT EAR: FUNCTIONAL
ADEQUATE_TO_COMPLETE_ADL: YES
ASSISTIVE_DEVICE: WALKER
TOILETING: NEEDS ASSISTANCE
BATHING: NEEDS ASSISTANCE
PATIENT'S MEMORY ADEQUATE TO SAFELY COMPLETE DAILY ACTIVITIES?: YES
LACK_OF_TRANSPORTATION: NO
GROOMING: NEEDS ASSISTANCE
JUDGMENT_ADEQUATE_SAFELY_COMPLETE_DAILY_ACTIVITIES: YES
DRESSING YOURSELF: NEEDS ASSISTANCE
FEEDING YOURSELF: NEEDS ASSISTANCE
DRESSING YOURSELF: NEEDS ASSISTANCE
PATIENT'S MEMORY ADEQUATE TO SAFELY COMPLETE DAILY ACTIVITIES?: YES

## 2025-03-27 ASSESSMENT — PAIN SCALES - GENERAL
PAINLEVEL_OUTOF10: 4
PAINLEVEL_OUTOF10: 0 - NO PAIN
PAINLEVEL_OUTOF10: 0 - NO PAIN
PAINLEVEL_OUTOF10: 4

## 2025-03-27 ASSESSMENT — PAIN - FUNCTIONAL ASSESSMENT: PAIN_FUNCTIONAL_ASSESSMENT: CPOT (CRITICAL CARE PAIN OBSERVATION TOOL)

## 2025-03-27 NOTE — PROGRESS NOTES
Physical Therapy                 Therapy Communication Note    Patient Name: Bev Christine  MRN: [id number]  Department: [place] [address]  Room: [address] [place] [address]  Today's Date: 3/27/2025     Discipline: Physical Therapy          Missed Visit Reason: Missed Visit Reason: Other (Comment)    Missed Time: Attempt    Comment: Per chart, ortho consult/recommendations pending for weight bearing status. Per care coordinator note, pt plans to continue with hospice. Per communication with care coordinator, ok to discontinue PT orders at this time.

## 2025-03-27 NOTE — PROGRESS NOTES
Emergency Medicine Transition of Care Note.    I received Bev Christine in signout from Dr. Gottlieb.  Please see the previous ED provider note for all HPI, PE and MDM up to the time of signout. This is in addition to the primary record.    In brief Bev Christine is an 89 y.o. female presenting for   Chief Complaint   Patient presents with    Rapid Heart Rate     Sudden onset today. Pt is DNR-comfort care on hospice.     At the time of signout we were awaiting: Admission    Diagnoses as of 03/27/25 0254   Closed fracture of left tibial plateau, initial encounter   General weakness   Atrial fibrillation with RVR (Multi)       Medical Decision Making  Family does not want to pursue surgical treatment for the tibial patella fracture possibility.  They would like her to be placed because she is too weak to walk.  Admitted to the hospitalist.    Final diagnoses:   [S82.142A] Closed fracture of left tibial plateau, initial encounter   [R53.1] General weakness   [I48.91] Atrial fibrillation with RVR (Multi)           Procedure  Procedures    Som Khan MD

## 2025-03-27 NOTE — ED PROVIDER NOTES
HPI   Chief Complaint   Patient presents with    Rapid Heart Rate     Sudden onset today. Pt is DNR-comfort care on hospice.       HPI: 89-year-old female with a history of hypertension and dementia presents for hypotension and atrial fibrillation with rapid ventricular response.  She is currently on hospice and is being treated for community-acquired pneumonia.  She had an episode of feeling anxious and shortness of breath earlier tonight.  Reportedly within the past several days she fell at her skilled nursing facility.  She is reporting some mid back pain and left knee pain.  Her daughter wanted her brought to the ED and evaluated      Limitations to history: None  Independent Historians: EMS, daughter  External Records Reviewed: HIE, outpatient notes, inpatient notes  ------------------------------------------------------------------------------------------------------------------------------------------  ROS: a ten point review of systems was performed and was negative except as per HPI.  ------------------------------------------------------------------------------------------------------------------------------------------  PMH / PSH: as per HPI, otherwise reviewed in EMR  MEDS: as per HPI, otherwise reviewed in EMR  ALLERGIES: as per HPI, otherwise reviewed in EMR  SocH:  as per HPI, otherwise reviewed in EMR  FH:  as per HPI, otherwise reviewed in EMR  ------------------------------------------------------------------------------------------------------------------------------------------  Physical Exam:  VS: As documented in the triage note and EMR flowsheet from this visit was reviewed  General: Fatigued appearing. No acute distress.   Eyes:  Extraocular movements grossly intact. No scleral icterus. No discharge  HEENT:  Normocephalic.  Atraumatic  Neck: Moves neck freely. No gross masses  CV: Irregularly irregular rhythm with a ventricular rate of 140 to 160 bpm. No murmurs, rubs or gallops   Resp: Clear  to auscultation bilaterally. No respiratory distress.    GI: Soft, no masses, nontender. No rebound tenderness or guarding  MSK: Symmetric muscle bulk. No deformities. No lower extremity edema.    Skin: Warm, dry, intact.   Neuro: No focal deficits.  Alert to person and place  Psych: Alert and conversive  ------------------------------------------------------------------------------------------------------------------------------------------  Hospital Course / Medical Decision Making:  Independent Interpretations: Chest x-ray  EKG as interpreted by me: Atrial fibrillation with rapid ventricular response with an approximate rate of 159 bpm with no signs of acute ischemia EKG #2 shows a normal sinus rhythm at 71 bpm with no signs of acute ischemia    MDM: 89-year-old female with a history of hypertension and dementia presents for hypotension and atrial fibrillation with rapid ventricular response.  She is found to be in atrial fibrillation with rapid ventricular response and following several doses of Lopressor did convert to a normal sinus rhythm.  Troponin is elevated at 94.  EKG is nonischemic.  She does have a leukocytosis of 18.1.  She is currently being treated for pneumonia.  Chest x-ray shows bibasilar airspace disease with bilateral small pleural effusions.  X-ray of the left knee shows a chronic appearing fracture at the medial tibial plateau.  She is having difficulty bearing weight on this extremity.  Her daughter is present at bedside and is the power of .  She does not want her to undergo a CT to evaluate for possible vertebral fracture or intracranial injury.  She is currently living in assisted living but may not be suitable for this seeing as she may have a small tibial plateau fracture.  After discussing further with her daughter the patient was made DNR comfort care.  She will be admitted to see occupational/physical therapy and possible SNF placement    Discussion of Management with Other  Providers:   I discussed the patient/results with: Emergency medicine team    Final diagnosis and disposition as below.    Results for orders placed or performed during the hospital encounter of 03/26/25  -CBC and Auto Differential:   Collection Time: 03/26/25 10:40 PM       Result                      Value             Ref Range           WBC                         18.1 (H)          4.4 - 11.3 x*       nRBC                        0.0               0.0 - 0.0 /1*       RBC                         3.34 (L)          4.00 - 5.20 *       Hemoglobin                  10.0 (L)          12.0 - 16.0 *       Hematocrit                  33.1 (L)          36.0 - 46.0 %       MCV                         99                80 - 100 fL         MCH                         29.9              26.0 - 34.0 *       MCHC                        30.2 (L)          32.0 - 36.0 *       RDW                         13.8              11.5 - 14.5 %       Platelets                   227               150 - 450 x1*       Neutrophils %               91.1              40.0 - 80.0 %       Immature Granulocytes *     0.8               0.0 - 0.9 %         Lymphocytes %               4.6               13.0 - 44.0 %       Monocytes %                 3.2               2.0 - 10.0 %        Eosinophils %               0.1               0.0 - 6.0 %         Basophils %                 0.2               0.0 - 2.0 %         Neutrophils Absolute        16.47 (H)         1.60 - 5.50 *       Immature Granulocytes *     0.14              0.00 - 0.50 *       Lymphocytes Absolute        0.83              0.80 - 3.00 *       Monocytes Absolute          0.57              0.05 - 0.80 *       Eosinophils Absolute        0.02              0.00 - 0.40 *       Basophils Absolute          0.03              0.00 - 0.10 *  -Magnesium:   Collection Time: 03/26/25 10:40 PM       Result                      Value             Ref Range           Magnesium                   1.61               1.60 - 2.40 *  -Comprehensive metabolic panel:   Collection Time: 03/26/25 10:40 PM       Result                      Value             Ref Range           Glucose                     209 (H)           74 - 99 mg/dL       Sodium                      137               136 - 145 mm*       Potassium                   4.4               3.5 - 5.3 mm*       Chloride                    108 (H)           98 - 107 mmo*       Bicarbonate                 15 (L)            21 - 32 mmol*       Anion Gap                   18                10 - 20 mmol*       Urea Nitrogen               26 (H)            6 - 23 mg/dL        Creatinine                  0.76              0.50 - 1.05 *       eGFR                        72                >60 mL/min/1*       Calcium                     7.7 (L)           8.6 - 10.3 m*       Albumin                     2.9 (L)           3.4 - 5.0 g/*       Alkaline Phosphatase        49                33 - 136 U/L        Total Protein               5.7 (L)           6.4 - 8.2 g/*       AST                         58 (H)            9 - 39 U/L          Bilirubin, Total            0.7               0.0 - 1.2 mg*       ALT                         39                7 - 45 U/L     -Troponin I, High Sensitivity:   Collection Time: 03/26/25 10:40 PM       Result                      Value             Ref Range           Troponin I, High Sensi*     94 (HH)           0 - 13 ng/L    XR knee left 4+ views   Final Result    Chronic appearing fracture of the medial tibial plateau given its    well corticated appearance in absence of lipohemarthrosis but there    is concern for occult fracture or inability to ambulate, CT is    recommended.                MACRO:    None.          Signed by: Jorge L Montero 3/26/2025 11:54 PM    Dictation workstation:   [id number]     XR chest 1 view   Final Result          Bibasilar airspace disease and bilateral small effusions. Underlying    infection in the differential.           Enlarged cardiac silhouette with vascular congestion and mild edema.          MACRO:    None          Signed by: Kaushik Delacruz 3/26/2025 10:17 PM    Dictation workstation:   [id number]                   Patient History   No past medical history on file.  No past surgical history on file.  No family history on file.  Social History     Tobacco Use    Smoking status: Never    Smokeless tobacco: Never   Substance Use Topics    Alcohol use: Not on file    Drug use: Not on file       Physical Exam   ED Triage Vitals   Temperature Heart Rate Respirations BP   03/26/25 2215 03/26/25 2200 03/26/25 2200 03/26/25 2200   36.5 °C (97.7 °F) (!) 150 (!) 29 99/83      Pulse Ox Temp Source Heart Rate Source Patient Position   03/26/25 2200 03/26/25 2215 03/26/25 2215 03/26/25 2215   96 % Oral Monitor Sitting      BP Location FiO2 (%)     03/26/25 2215 --     Right arm        Physical Exam      ED Course & MDM                  No data recorded     Mick Coma Scale Score: 15 (03/26/25 2220 : Mello Rivera, JOSR)                           Medical Decision Making      Procedure  Procedures     Yeison Gottlieb,   03/27/25 0213

## 2025-03-27 NOTE — PROGRESS NOTES
Pharmacy Medication History     Source of Information: Pt medication list faxed from [facility]    Additional concerns with the patient's PTA list.   N/A  Notified Provider via Haiku : Yes    The following updates were made to the Prior to Admission medication list:     Medications ADDED:   Actonel 150 mg tablet  Docusate 100 mg capsule  Ipratropium-albuterol 0.5-2.5 mg / 3 ml nebulizer sol.  Levaquin 750 mg tablet   Hyoscyamine 0.125 mg dis tablet  Lorazepam 0.5 mg tablet  Morphine sulfate oral sol 20 mg/ 5 ml   Nystatin 045027 unit/ gm topical powder   Promethazine 12.5 mg tablet   Sennoside 8.6 mg tablet  Tylenol 325 mg tablet  Medications CHANGED:  N/a  Medications REMOVED:   N/A  Medications NOT TAKING:   N/A    Allergy reviewed : Yes    Meds 2 Beds : No    Outpatient pharmacy confirmed and updated in chart : No    Pharmacy name: N/A    The list below reflectives the updated PTA list. Please review each medication in order reconciliation for additional clarification and justification.    Prior to Admission Medications   Prescriptions Last Dose Informant   LORazepam (Ativan) 0.5 mg tablet     Sig: Take 1 tablet (0.5 mg) by mouth 4 times a day as needed for anxiety.   acetaminophen (Tylenol) 325 mg tablet     Sig: Take 2 tablets (650 mg) by mouth every 6 hours if needed for mild pain (1 - 3).   benzonatate (Tessalon) 100 mg capsule     Sig: Take 1 capsule (100 mg) by mouth 3 times a day as needed for cough. Do not crush or chew.   bisacodyl (Dulcolax, bisacodyl,) 10 mg suppository     Sig: Insert 1 suppository (10 mg) into the rectum once daily as needed for constipation. Every 24 hours for constipation   carvedilol (Coreg) 3.125 mg tablet     Sig: Take 1 tablet (3.125 mg) by mouth 2 times a day.   cholecalciferol (Vitamin D-3) 25 MCG (1000 UT) tablet     Sig: Take 1 tablet (1,000 Units) by mouth once daily.   docusate sodium (Colace) 100 mg capsule     Sig: Take 1 capsule (100 mg) by mouth early in the morning..    hydrALAZINE (Apresoline) 25 mg tablet     Sig: Take 1 tablet (25 mg) by mouth once daily.   hyoscyamine (Anaspaz) 0.125 mg disintegrating tablet     Sig: Dissolve 1 tablet (0.125 mg) in the mouth every 4 hours if needed. For secretion   ipratropium-albuteroL (Duo-Neb) 0.5-2.5 mg/3 mL nebulizer solution     Sig: Take 3 mL by nebulization every 6 hours if needed for wheezing or shortness of breath.   levoFLOXacin (Levaquin) 750 mg tablet     Sig: Take 1 tablet (750 mg) by mouth once daily. For 7 days   losartan (Cozaar) 100 mg tablet     Sig: Take 1 tablet (100 mg) by mouth once daily.   morphine 20 mg/5 mL (4 mg/mL) solution     Sig: Take 0.25 mL (1 mg) by mouth every 2 hours if needed for severe pain (7 - 10).   multivitamin tablet     Sig: Take 1 tablet by mouth once daily.   nystatin (Mycostatin) 100,000 unit/gram powder     Sig: Apply 1 Application topically every 8 hours if needed for itching or rash. Apply to affected area topically every 8 hours as needed   potassium chloride CR 10 mEq ER tablet     Sig: Take 1 tablet (10 mEq) by mouth 2 times a day. Do not crush, chew, or split.   promethazine (Phenergan) 12.5 mg tablet     Sig: Take 1 tablet (12.5 mg) by mouth every 6 hours if needed for nausea or vomiting.   risedronate (Actonel) 150 mg tablet     Sig: Take 1 tablet (150 mg) by mouth every 30 (thirty) days. Take one tablet one time a day on the 21st and ending on the 21st every month for osteoporosis. Drink full glass of water,  medication to be administered separately from all other medications, must sit for 30 minutes afterwards.   sennosides (Senokot) 8.6 mg tablet     Sig: Take 1 tablet (8.6 mg) by mouth once daily.   sertraline (Zoloft) 25 mg tablet     Sig: Take 1 tablet (25 mg) by mouth once daily.      Facility-Administered Medications: None       The list below reflectives the updated allergy list. Please review each documented allergy for additional clarification and justification.    Allergies    Allergen Reactions    Iodinated Contrast Media Unknown     Face flush    Amlodipine Swelling     LE edema    Penicillins Rash          03/27/25 at 3:22 PM - Michelle Radames

## 2025-03-27 NOTE — PROGRESS NOTES
Occupational Therapy                 Therapy Communication Note    Patient Name: Bev Christine  MRN: [id number]  Department: [place] [address]  Room: [address] [place] [address]  Today's Date: 3/27/2025     Discipline: Occupational Therapy    OT Missed Visit: Yes     Missed Visit Reason: Missed Visit Reason: Other (Comment) (Per pt. chart review, pt. awaiting ortho for tibial plauteu fx, no sx per family request, awaiting WBing status. However, note from [facility], pt. activie with hospice prior to admission. OT to defer for status clarification.)    Missed Time: Attempt

## 2025-03-27 NOTE — CONSULTS
Inpatient consult to Palliative Care  Consult performed by: RAFAELA Muhammad-CNP  Consult ordered by: Jorge Britt MD          Reason For Consult  Reason for Consult: communication / medical decision making     History Of Present Illness  Bev Christine is a 89 y.o. female with past medical history of HTN and dementia presented to our ED 3/27/25 from her nursing facility with diarrhea, hypotension and a-fib with RVR.  Patient recently treated for PNA.  It had also been reported that she fell and has been found to have a closed rx of the left tibial plateau.  Daughter believes imaging may just be showing a fx sustained in Oct.  Daughter states main reason she brought patient in was for weakness and GI distress that she thinks was related to levofloxacin patient received for PNA.  Sx started after single dose.      Family reported patient had been active with [facility] at facility, wanted to retain DNRCC, and are not interested in surgery.   Ortho has been consulted to advise on WB status.  Palliative medicine consulted for goals of care, assistance with hospice coordination.      Symptoms (0 - 10, Best to Worst)  [place] Symptom Assessment System  0-10 (Numeric) Pain Score: 4    BM in last 48 hours? yes       Personal/Social History  She reports that she has never smoked. She has never used smokeless tobacco. No history on file for alcohol use and drug use.     Past Medical History  She has no past medical history on file.    Surgical History  She has no past surgical history on file.     Family History  No family history on file.  Allergies  Iodinated contrast media, Amlodipine, and Penicillins    Review of Systems   Constitutional:  Positive for appetite change.   HENT: Negative.     Eyes: Negative.    Respiratory:  Negative for cough and shortness of breath.    Cardiovascular: Negative.    Gastrointestinal:  Positive for diarrhea and nausea.        No GI sx since admission  Tolerating diet but poor  "PO today, daughter reports patient just keeps falling asleep   Endocrine: Negative.    Genitourinary: Negative.    Musculoskeletal:         Reports left knee does not hurt presently but has not tried to walk on it yet.   Skin: Negative.    Allergic/Immunologic: Negative.    Neurological:  Positive for weakness.   Hematological: Negative.    Psychiatric/Behavioral: Negative.          Physical Exam  Constitutional:       Comments: Thin, frail.  NAD   HENT:      Head: Normocephalic and atraumatic.      Nose: Nose normal.      Mouth/Throat:      Mouth: Mucous membranes are moist.      Pharynx: Oropharynx is clear.   Eyes:      Conjunctiva/sclera: Conjunctivae normal.      Pupils: Pupils are equal, round, and reactive to light.   Cardiovascular:      Rate and Rhythm: Normal rate and regular rhythm.      Pulses: Normal pulses.      Heart sounds: Normal heart sounds.   Pulmonary:      Effort: Pulmonary effort is normal. No respiratory distress.      Breath sounds: Normal breath sounds. No wheezing, rhonchi or rales.   Abdominal:      General: Abdomen is flat. Bowel sounds are normal.      Palpations: Abdomen is soft.      Tenderness: There is no abdominal tenderness.   Genitourinary:     Comments: deferred  Musculoskeletal:         General: Normal range of motion.      Cervical back: Normal range of motion.      Comments: Trace edema left knee, non tender to palpation.  No excessive warmth, no redness   Skin:     General: Skin is warm and dry.   Neurological:      Mental Status: She is alert.      Comments: Oriented to self, situation   Psychiatric:         Mood and Affect: Mood normal.         Behavior: Behavior normal.         Last Recorded Vitals  Blood pressure 156/76, pulse 66, temperature 36.1 °C (97 °F), temperature source Temporal, resp. rate 17, height 1.499 m (4' 11\"), weight (!) 40.8 kg (90 lb), SpO2 98%.    Relevant Results  Electrocardiogram, 12-lead PRN ACS symptoms    Result Date: 3/27/2025  Atrial " fibrillation with rapid ventricular response Biventricular hypertrophy Septal infarct , age undetermined Lateral infarct , possibly acute Marked ST abnormality, possible inferior subendocardial injury ** ** ACUTE MI / STEMI ** ** Abnormal ECG When compared with ECG of 14-AUG-2024 22:47, Significant changes have occurred    XR knee left 4+ views    Result Date: 3/26/2025  Interpreted By:  Jorge L Montero, STUDY: XR KNEE LEFT 4+ VIEWS; ;  3/26/2025 11:19 pm   INDICATION: Signs/Symptoms:Pain around left knee, fall last week onto left knee.     COMPARISON: None.   ACCESSION NUMBER(S): [id number]   ORDERING CLINICIAN: FAYE MCDERMOTT   FINDINGS: Osteopenic bone. Trace joint effusion. Well corticated lucency in the medial plateau likely represents remote fracture or an area of incomplete osteophyte formation. Small joint effusion.       Chronic appearing fracture of the medial tibial plateau given its well corticated appearance in absence of lipohemarthrosis but there is concern for occult fracture or inability to ambulate, CT is recommended.     MACRO: None.   Signed by: Jorge L Montero 3/26/2025 11:54 PM Dictation workstation:   [id number]    XR chest 1 view    Result Date: 3/26/2025  Interpreted By:  Kaushik Delacruz, STUDY: XR CHEST 1 VIEW;  3/26/2025 10:10 pm   INDICATION: Signs/Symptoms:Atrial fibrillation, chest tightness.     COMPARISON: None.   ACCESSION NUMBER(S): [id number]   ORDERING CLINICIAN: FAYE MCDERMOTT   FINDINGS: There is moderate amount of the cardiac silhouette. There is pulmonary congestion with mild edema. There is patchy bibasilar airspace disease with small effusions. Note is made of a large hiatal hernia         Bibasilar airspace disease and bilateral small effusions. Underlying infection in the differential.   Enlarged cardiac silhouette with vascular congestion and mild edema.   MACRO: None   Signed by: Kaushik Delacruz 3/26/2025 10:17 PM Dictation workstation:   [id number]      Results  for orders placed or performed during the hospital encounter of 03/26/25 (from the past 24 hours)   Electrocardiogram, 12-lead PRN ACS symptoms   Result Value Ref Range    Ventricular Rate 159 BPM    QRS Duration 78 ms    QT Interval 292 ms    QTC Calculation(Bazett) 474 ms    R Axis 261 degrees    T Axis 86 degrees    QRS Count 27 beats    Q Onset 219 ms    T Offset 365 ms    QTC Fredericia 403 ms   CBC and Auto Differential   Result Value Ref Range    WBC 18.1 (H) 4.4 - 11.3 x10*3/uL    nRBC 0.0 0.0 - 0.0 /100 WBCs    RBC 3.34 (L) 4.00 - 5.20 x10*6/uL    Hemoglobin 10.0 (L) 12.0 - 16.0 g/dL    Hematocrit 33.1 (L) 36.0 - 46.0 %    MCV 99 80 - 100 fL    MCH 29.9 26.0 - 34.0 pg    MCHC 30.2 (L) 32.0 - 36.0 g/dL    RDW 13.8 11.5 - 14.5 %    Platelets 227 150 - 450 x10*3/uL    Neutrophils % 91.1 40.0 - 80.0 %    Immature Granulocytes %, Automated 0.8 0.0 - 0.9 %    Lymphocytes % 4.6 13.0 - 44.0 %    Monocytes % 3.2 2.0 - 10.0 %    Eosinophils % 0.1 0.0 - 6.0 %    Basophils % 0.2 0.0 - 2.0 %    Neutrophils Absolute 16.47 (H) 1.60 - 5.50 x10*3/uL    Immature Granulocytes Absolute, Automated 0.14 0.00 - 0.50 x10*3/uL    Lymphocytes Absolute 0.83 0.80 - 3.00 x10*3/uL    Monocytes Absolute 0.57 0.05 - 0.80 x10*3/uL    Eosinophils Absolute 0.02 0.00 - 0.40 x10*3/uL    Basophils Absolute 0.03 0.00 - 0.10 x10*3/uL   Magnesium   Result Value Ref Range    Magnesium 1.61 1.60 - 2.40 mg/dL   Comprehensive metabolic panel   Result Value Ref Range    Glucose 209 (H) 74 - 99 mg/dL    Sodium 137 136 - 145 mmol/L    Potassium 4.4 3.5 - 5.3 mmol/L    Chloride 108 (H) 98 - 107 mmol/L    Bicarbonate 15 (L) 21 - 32 mmol/L    Anion Gap 18 10 - 20 mmol/L    Urea Nitrogen 26 (H) 6 - 23 mg/dL    Creatinine 0.76 0.50 - 1.05 mg/dL    eGFR 72 >60 mL/min/1.73m*2    Calcium 7.7 (L) 8.6 - 10.3 mg/dL    Albumin 2.9 (L) 3.4 - 5.0 g/dL    Alkaline Phosphatase 49 33 - 136 U/L    Total Protein 5.7 (L) 6.4 - 8.2 g/dL    AST 58 (H) 9 - 39 U/L    Bilirubin,  Total 0.7 0.0 - 1.2 mg/dL    ALT 39 7 - 45 U/L   Troponin I, High Sensitivity   Result Value Ref Range    Troponin I, High Sensitivity 94 (HH) 0 - 13 ng/L   Basic metabolic panel   Result Value Ref Range    Glucose 112 (H) 74 - 99 mg/dL    Sodium 139 136 - 145 mmol/L    Potassium 4.2 3.5 - 5.3 mmol/L    Chloride 107 98 - 107 mmol/L    Bicarbonate 25 21 - 32 mmol/L    Anion Gap 11 10 - 20 mmol/L    Urea Nitrogen 31 (H) 6 - 23 mg/dL    Creatinine 0.77 0.50 - 1.05 mg/dL    eGFR 71 >60 mL/min/1.73m*2    Calcium 8.6 8.6 - 10.3 mg/dL   CBC   Result Value Ref Range    WBC 13.7 (H) 4.4 - 11.3 x10*3/uL    nRBC 0.0 0.0 - 0.0 /100 WBCs    RBC 3.34 (L) 4.00 - 5.20 x10*6/uL    Hemoglobin 10.2 (L) 12.0 - 16.0 g/dL    Hematocrit 31.3 (L) 36.0 - 46.0 %    MCV 94 80 - 100 fL    MCH 30.5 26.0 - 34.0 pg    MCHC 32.6 32.0 - 36.0 g/dL    RDW 13.7 11.5 - 14.5 %    Platelets 254 150 - 450 x10*3/uL      Scheduled medications  heparin (porcine), 5,000 Units, subcutaneous, q8h TALHA  lidocaine, 1 patch, transdermal, Daily  melatonin, 3 mg, oral, Nightly  pantoprazole, 40 mg, oral, Daily before breakfast   Or  pantoprazole, 40 mg, intravenous, Daily before breakfast  sennosides, 2 tablet, oral, BID  sertraline, 25 mg, oral, Daily      Continuous medications     PRN medications  PRN medications: acetaminophen **OR** acetaminophen **OR** acetaminophen, acetaminophen **OR** acetaminophen **OR** acetaminophen, guaiFENesin, metoprolol, ondansetron **OR** ondansetron       Assessment/Plan     Bev Christine is a 89 y.o. female with past medical history of HTN and dementia presented to our ED 3/27/25 from her nursing facility with diarrhea, hypotension and a-fib with RVR.  Patient recently treated for PNA.  It had also been reported that she fell and has been found to have a closed fx of the left tibial plateau.  Daughter believes imaging may just be showing a fx sustained in Oct.  Daughter states main reason she brought patient in was for weakness  and GI distress that she thinks was related to levofloxacin patient received for PNA.  Sx started after single dose.      Family reported patient had been active with [facility] at facility, wanted to retain DNRCC, and are not interested in surgery.   Ortho has been consulted to advise on WB status.  Palliative medicine consulted for goals of care, assistance with hospice coordination.     Goals of care:  no plan for surgery.  Only question about dispo at this time is whether or not patient needs SNF post discharge before returning to her [facility] with hospice care  -continue to honor DNRCC status  -will make sure patient has [place] Portable at bedside for transport  -await ortho input regarding therapy  -palliative SW to reach out to [facility]    Left knee tibial fx: may or may not be new.  Pending ortho input.  Patient denies pain at rest and seems comfortable with palpation and ROM  -continue APAP PRN, can escalate if not effective, will follow up if weight bearing  -ortho/PT/OT ordered    Community acquired PNA with GI distress:  may have been linked to ATB.  No issues at present.  + UA.  Patient without sx, lungs CTA.  -defer tx plan to primary team  -suggest holding senna, consider probiotic if ATB resumed           I spent 60 minutes in the professional and overall care of this patient.      Ammy Colbert, APRN-CNP

## 2025-03-27 NOTE — H&P
History Of Present Illness  Bev Christine is a 89 y.o. female presenting with concern of fall. Pt is not able to offer much history hence obtained from chart. She has history of hypertension and dementia. She is in hospice. That she ahd cough last few days had xray at [facility] noted  and  being treated for community-acquired pneumonia on levaquin,  started yesterday and had some nausea and vomiting and diarrhea however dtr reports pt on few medications for her bowels.. Reportedly w  fell on Tuesday at her facility. She is reporting some mid back pain and left knee pain. Her daughter wanted her brought to the ED and evaluated work up for possible dehydration as not taking much po. Pt was reproting more knee pain than  usual. Came to Er work up noted Chest x-ray shows bibasilar airspace disease with bilateral small pleural effusions. X-ray of the left knee shows a chronic appearing fracture at the medial tibial plateau. She is having difficulty bearing weight on this extremity. Pall med was consulted. Hrs improved now 60s-70s. Admitted for further care      Past Medical History  She has no past medical history on file.    Surgical History  She has no past surgical history on file.     Social History  She reports that she has never smoked. She has never used smokeless tobacco. No history on file for alcohol use and drug use.    Family History  No family history on file.     Allergies  Iodinated contrast media, Amlodipine, and Penicillins    Review of Systems     Physical Exam   General: thin and frail appearing  Awake  No acute distress.  Answering questions appropriately   Eyes:  Extraocular movements grossly intact. No scleral icterus. No discharge s  CV: regular No murmurs, rubs or gallops   Resp: Clear to auscultation bilaterally. No respiratory distress.    GI: Soft, no masses, nontender. No rebound tenderness or guarding  MSK: Symmetric muscle bulk. No deformities. No lower extremity edema.    Skin: Warm, dry,  intact.   Neuro: No focal deficits.  Alert to person and place  Psych: Alert and conversive  Last Recorded Vitals  /76 (BP Location: Right arm, Patient Position: Lying)   Pulse 66   Temp 36.1 °C (97 °F) (Temporal)   Resp 17   Wt (!) 40.8 kg (90 lb)   SpO2 98%     Relevant Results           No current facility-administered medications on file prior to encounter.     Current Outpatient Medications on File Prior to Encounter   Medication Sig Dispense Refill    acetaminophen (Tylenol) 500 mg tablet Take 2 tablets (1,000 mg) by mouth every 6 hours if needed for moderate pain (4 - 6).      benzonatate (Tessalon) 100 mg capsule Take 1 capsule (100 mg) by mouth 3 times a day as needed for cough. Do not crush or chew. 20 capsule 0    carvedilol (Coreg) 3.125 mg tablet Take 1 tablet (3.125 mg) by mouth 2 times a day. 60 tablet 0    cholecalciferol (Vitamin D-3) 25 MCG (1000 UT) tablet Take 1 tablet (1,000 Units) by mouth once daily.      hydrALAZINE (Apresoline) 25 mg tablet Take 1 tablet (25 mg) by mouth once daily.      losartan (Cozaar) 100 mg tablet Take 1 tablet (100 mg) by mouth once daily.      multivitamin tablet Take 1 tablet by mouth once daily.      potassium chloride CR 10 mEq ER tablet Take 1 tablet (10 mEq) by mouth 2 times a day. Do not crush, chew, or split.      sertraline (Zoloft) 25 mg tablet Take 1 tablet (25 mg) by mouth once daily.       Results for orders placed or performed during the hospital encounter of 03/26/25 (from the past 24 hours)   Electrocardiogram, 12-lead PRN ACS symptoms   Result Value Ref Range    Ventricular Rate 159 BPM    QRS Duration 78 ms    QT Interval 292 ms    QTC Calculation(Bazett) 474 ms    R Axis 261 degrees    T Axis 86 degrees    QRS Count 27 beats    Q Onset 219 ms    T Offset 365 ms    QTC Fredericia 403 ms   CBC and Auto Differential   Result Value Ref Range    WBC 18.1 (H) 4.4 - 11.3 x10*3/uL    nRBC 0.0 0.0 - 0.0 /100 WBCs    RBC 3.34 (L) 4.00 - 5.20  x10*6/uL    Hemoglobin 10.0 (L) 12.0 - 16.0 g/dL    Hematocrit 33.1 (L) 36.0 - 46.0 %    MCV 99 80 - 100 fL    MCH 29.9 26.0 - 34.0 pg    MCHC 30.2 (L) 32.0 - 36.0 g/dL    RDW 13.8 11.5 - 14.5 %    Platelets 227 150 - 450 x10*3/uL    Neutrophils % 91.1 40.0 - 80.0 %    Immature Granulocytes %, Automated 0.8 0.0 - 0.9 %    Lymphocytes % 4.6 13.0 - 44.0 %    Monocytes % 3.2 2.0 - 10.0 %    Eosinophils % 0.1 0.0 - 6.0 %    Basophils % 0.2 0.0 - 2.0 %    Neutrophils Absolute 16.47 (H) 1.60 - 5.50 x10*3/uL    Immature Granulocytes Absolute, Automated 0.14 0.00 - 0.50 x10*3/uL    Lymphocytes Absolute 0.83 0.80 - 3.00 x10*3/uL    Monocytes Absolute 0.57 0.05 - 0.80 x10*3/uL    Eosinophils Absolute 0.02 0.00 - 0.40 x10*3/uL    Basophils Absolute 0.03 0.00 - 0.10 x10*3/uL   Magnesium   Result Value Ref Range    Magnesium 1.61 1.60 - 2.40 mg/dL   Comprehensive metabolic panel   Result Value Ref Range    Glucose 209 (H) 74 - 99 mg/dL    Sodium 137 136 - 145 mmol/L    Potassium 4.4 3.5 - 5.3 mmol/L    Chloride 108 (H) 98 - 107 mmol/L    Bicarbonate 15 (L) 21 - 32 mmol/L    Anion Gap 18 10 - 20 mmol/L    Urea Nitrogen 26 (H) 6 - 23 mg/dL    Creatinine 0.76 0.50 - 1.05 mg/dL    eGFR 72 >60 mL/min/1.73m*2    Calcium 7.7 (L) 8.6 - 10.3 mg/dL    Albumin 2.9 (L) 3.4 - 5.0 g/dL    Alkaline Phosphatase 49 33 - 136 U/L    Total Protein 5.7 (L) 6.4 - 8.2 g/dL    AST 58 (H) 9 - 39 U/L    Bilirubin, Total 0.7 0.0 - 1.2 mg/dL    ALT 39 7 - 45 U/L   Troponin I, High Sensitivity   Result Value Ref Range    Troponin I, High Sensitivity 94 (HH) 0 - 13 ng/L   Basic metabolic panel   Result Value Ref Range    Glucose 112 (H) 74 - 99 mg/dL    Sodium 139 136 - 145 mmol/L    Potassium 4.2 3.5 - 5.3 mmol/L    Chloride 107 98 - 107 mmol/L    Bicarbonate 25 21 - 32 mmol/L    Anion Gap 11 10 - 20 mmol/L    Urea Nitrogen 31 (H) 6 - 23 mg/dL    Creatinine 0.77 0.50 - 1.05 mg/dL    eGFR 71 >60 mL/min/1.73m*2    Calcium 8.6 8.6 - 10.3 mg/dL   CBC   Result  Value Ref Range    WBC 13.7 (H) 4.4 - 11.3 x10*3/uL    nRBC 0.0 0.0 - 0.0 /100 WBCs    RBC 3.34 (L) 4.00 - 5.20 x10*6/uL    Hemoglobin 10.2 (L) 12.0 - 16.0 g/dL    Hematocrit 31.3 (L) 36.0 - 46.0 %    MCV 94 80 - 100 fL    MCH 30.5 26.0 - 34.0 pg    MCHC 32.6 32.0 - 36.0 g/dL    RDW 13.7 11.5 - 14.5 %    Platelets 254 150 - 450 x10*3/uL   Urinalysis with Reflex Microscopic   Result Value Ref Range    Color, Urine Yellow Light-Yellow, Yellow, Dark-Yellow    Appearance, Urine Clear Clear    Specific Gravity, Urine 1.031 1.005 - 1.035    pH, Urine 6.0 5.0, 5.5, 6.0, 6.5, 7.0, 7.5, 8.0    Protein, Urine 50 (1+) (A) NEGATIVE, 10 (TRACE), 20 (TRACE) mg/dL    Glucose, Urine Normal Normal mg/dL    Blood, Urine NEGATIVE NEGATIVE mg/dL    Ketones, Urine NEGATIVE NEGATIVE mg/dL    Bilirubin, Urine NEGATIVE NEGATIVE mg/dL    Urobilinogen, Urine Normal Normal mg/dL    Nitrite, Urine NEGATIVE NEGATIVE    Leukocyte Esterase, Urine 25 Johanne/uL (A) NEGATIVE   Microscopic Only, Urine   Result Value Ref Range    WBC, Urine 6-10 (A) 1-5, NONE /HPF    RBC, Urine NONE NONE, 1-2, 3-5 /HPF    Squamous Epithelial Cells, Urine 1-9 (SPARSE) Reference range not established. /HPF    Mucus, Urine 2+ Reference range not established. /LPF    Hyaline Casts, Urine 1+ (A) NONE /LPF          Assessment/Plan   Assessment & Plan  Closed fracture of left tibial plateau, initial encounter    Atrial fibrillation (Multi)    Dementia      Concern of pneumonia  Dehydration   Ivf and abx    Concern of left knee tib fx  Dtr does not want surgery   Dtr wants WB status, and PTOT with goal of going to SNF   Dtr wants to see how pt does with antibiotics off bowel regimen, w zofran     Was in afib rvr previously   Check EKG  Hrs improved, 60-70s  Tele     She is hospice  Pall med following for goals of care     -Continue current treatment as ordered. Will make adjustments as necessary.    Plan of care discussed with: Provider, RN, Patient  D/w dtr via phone .            Patient case and plan of care discussed with Dr. ANDREAS Britt.    Jeronimo Fink, RAFAELA - CNP  -In collaboration with Dr. ANDREAS Britt    [facility]  Office: [phone]  Fax: [phone]   I have reviewed the above note obtained and documented by the NP/PA and I personally participated in the key components. I have discussed the case and management of the patient's care. Changes made to the note, and all key components of history and physical/progress note done by me.  dw nursing  Seen this evenign   Will cont with current   Levaquin  And fluids  Boost   Branden daughter    Jorge Britt MD

## 2025-03-27 NOTE — PROGRESS NOTES
03/27/25 1408   Discharge Planning   Living Arrangements Alone   Support Systems Children   Type of Residence Assisted living   Care Facility Name [facility]   Home or Post Acute Services None   Expected Discharge Disposition Home   Does the patient need discharge transport arranged? Yes   RoundTrip coordination needed? Yes     Patient from [facility].  Patient is active with [facility] and patient would like to continue.  Will return to [facility] upon discharge.

## 2025-03-27 NOTE — CARE PLAN
The patient's goals for the shift include no falls    The clinical goals for the shift include remain safe and HDS this shift      Problem: Pain - Adult  Goal: Verbalizes/displays adequate comfort level or baseline comfort level  Outcome: Progressing     Problem: Safety - Adult  Goal: Free from fall injury  Outcome: Progressing     Problem: Discharge Planning  Goal: Discharge to home or other facility with appropriate resources  Outcome: Progressing     Problem: Chronic Conditions and Co-morbidities  Goal: Patient's chronic conditions and co-morbidity symptoms are monitored and maintained or improved  Outcome: Progressing     Problem: Nutrition  Goal: Nutrient intake appropriate for maintaining nutritional needs  Outcome: Progressing     Problem: Skin  Goal: Decreased wound size/increased tissue granulation at next dressing change  Outcome: Progressing  Goal: Participates in plan/prevention/treatment measures  Outcome: Progressing  Goal: Prevent/manage excess moisture  Outcome: Progressing  Goal: Prevent/minimize sheer/friction injuries  Outcome: Progressing  Goal: Promote/optimize nutrition  Outcome: Progressing  Goal: Promote skin healing  Outcome: Progressing     Problem: Fall/Injury  Goal: Not fall by end of shift  Outcome: Progressing  Goal: Be free from injury by end of the shift  Outcome: Progressing  Goal: Verbalize understanding of personal risk factors for fall in the hospital  Outcome: Progressing  Goal: Verbalize understanding of risk factor reduction measures to prevent injury from fall in the home  Outcome: Progressing  Goal: Use assistive devices by end of the shift  Outcome: Progressing  Goal: Pace activities to prevent fatigue by end of the shift  Outcome: Progressing     Problem: Pain  Goal: Takes deep breaths with improved pain control throughout the shift  Outcome: Progressing  Goal: Turns in bed with improved pain control throughout the shift  Outcome: Progressing  Goal: Walks with improved pain  control throughout the shift  Outcome: Progressing  Goal: Performs ADL's with improved pain control throughout shift  Outcome: Progressing  Goal: Participates in PT with improved pain control throughout the shift  Outcome: Progressing  Goal: Free from opioid side effects throughout the shift  Outcome: Progressing  Goal: Free from acute confusion related to pain meds throughout the shift  Outcome: Progressing

## 2025-03-28 ENCOUNTER — APPOINTMENT (OUTPATIENT)
Dept: RADIOLOGY | Facility: HOSPITAL | Age: OVER 89
DRG: 640 | End: 2025-03-28
Payer: MEDICARE

## 2025-03-28 LAB
ANION GAP SERPL CALC-SCNC: 11 MMOL/L (ref 10–20)
BUN SERPL-MCNC: 23 MG/DL (ref 6–23)
CALCIUM SERPL-MCNC: 7.7 MG/DL (ref 8.6–10.3)
CHLORIDE SERPL-SCNC: 109 MMOL/L (ref 98–107)
CO2 SERPL-SCNC: 22 MMOL/L (ref 21–32)
CREAT SERPL-MCNC: 0.7 MG/DL (ref 0.5–1.05)
EGFRCR SERPLBLD CKD-EPI 2021: 79 ML/MIN/1.73M*2
ERYTHROCYTE [DISTWIDTH] IN BLOOD BY AUTOMATED COUNT: 13.6 % (ref 11.5–14.5)
GLUCOSE SERPL-MCNC: 77 MG/DL (ref 74–99)
HCT VFR BLD AUTO: 28.9 % (ref 36–46)
HGB BLD-MCNC: 9.3 G/DL (ref 12–16)
MCH RBC QN AUTO: 29.8 PG (ref 26–34)
MCHC RBC AUTO-ENTMCNC: 32.2 G/DL (ref 32–36)
MCV RBC AUTO: 93 FL (ref 80–100)
NRBC BLD-RTO: 0 /100 WBCS (ref 0–0)
PLATELET # BLD AUTO: 195 X10*3/UL (ref 150–450)
POTASSIUM SERPL-SCNC: 3.6 MMOL/L (ref 3.5–5.3)
RBC # BLD AUTO: 3.12 X10*6/UL (ref 4–5.2)
SODIUM SERPL-SCNC: 138 MMOL/L (ref 136–145)
WBC # BLD AUTO: 7.1 X10*3/UL (ref 4.4–11.3)

## 2025-03-28 PROCEDURE — 73700 CT LOWER EXTREMITY W/O DYE: CPT | Mod: LEFT SIDE | Performed by: RADIOLOGY

## 2025-03-28 PROCEDURE — 85027 COMPLETE CBC AUTOMATED: CPT | Performed by: NURSE PRACTITIONER

## 2025-03-28 PROCEDURE — 2500000002 HC RX 250 W HCPCS SELF ADMINISTERED DRUGS (ALT 637 FOR MEDICARE OP, ALT 636 FOR OP/ED): Performed by: FAMILY MEDICINE

## 2025-03-28 PROCEDURE — 36415 COLL VENOUS BLD VENIPUNCTURE: CPT | Performed by: NURSE PRACTITIONER

## 2025-03-28 PROCEDURE — 73502 X-RAY EXAM HIP UNI 2-3 VIEWS: CPT | Mod: LEFT SIDE | Performed by: RADIOLOGY

## 2025-03-28 PROCEDURE — 1100000001 HC PRIVATE ROOM DAILY

## 2025-03-28 PROCEDURE — 2500000005 HC RX 250 GENERAL PHARMACY W/O HCPCS: Performed by: FAMILY MEDICINE

## 2025-03-28 PROCEDURE — 73502 X-RAY EXAM HIP UNI 2-3 VIEWS: CPT | Mod: LT

## 2025-03-28 PROCEDURE — 80048 BASIC METABOLIC PNL TOTAL CA: CPT | Performed by: NURSE PRACTITIONER

## 2025-03-28 PROCEDURE — 2500000001 HC RX 250 WO HCPCS SELF ADMINISTERED DRUGS (ALT 637 FOR MEDICARE OP): Performed by: FAMILY MEDICINE

## 2025-03-28 PROCEDURE — 73700 CT LOWER EXTREMITY W/O DYE: CPT | Mod: LT

## 2025-03-28 PROCEDURE — 2500000004 HC RX 250 GENERAL PHARMACY W/ HCPCS (ALT 636 FOR OP/ED): Performed by: FAMILY MEDICINE

## 2025-03-28 PROCEDURE — 99223 1ST HOSP IP/OBS HIGH 75: CPT

## 2025-03-28 RX ORDER — HYDRALAZINE HYDROCHLORIDE 10 MG/1
10 TABLET, FILM COATED ORAL ONCE
Status: COMPLETED | OUTPATIENT
Start: 2025-03-28 | End: 2025-03-28

## 2025-03-28 RX ADMIN — SERTRALINE HYDROCHLORIDE 25 MG: 50 TABLET ORAL at 10:27

## 2025-03-28 RX ADMIN — HEPARIN SODIUM 5000 UNITS: 5000 INJECTION INTRAVENOUS; SUBCUTANEOUS at 22:45

## 2025-03-28 RX ADMIN — HEPARIN SODIUM 5000 UNITS: 5000 INJECTION INTRAVENOUS; SUBCUTANEOUS at 10:27

## 2025-03-28 RX ADMIN — HYDRALAZINE HYDROCHLORIDE 10 MG: 10 TABLET ORAL at 10:27

## 2025-03-28 RX ADMIN — PANTOPRAZOLE SODIUM 40 MG: 40 TABLET, DELAYED RELEASE ORAL at 10:27

## 2025-03-28 RX ADMIN — Medication 3 MG: at 22:45

## 2025-03-28 RX ADMIN — LIDOCAINE 4% 1 PATCH: 40 PATCH TOPICAL at 10:26

## 2025-03-28 ASSESSMENT — COGNITIVE AND FUNCTIONAL STATUS - GENERAL
CLIMB 3 TO 5 STEPS WITH RAILING: TOTAL
MOBILITY SCORE: 15
TURNING FROM BACK TO SIDE WHILE IN FLAT BAD: A LITTLE
HELP NEEDED FOR BATHING: A LOT
DAILY ACTIVITIY SCORE: 16
TOILETING: A LITTLE
PERSONAL GROOMING: A LITTLE
CLIMB 3 TO 5 STEPS WITH RAILING: A LOT
STANDING UP FROM CHAIR USING ARMS: A LOT
TOILETING: A LOT
HELP NEEDED FOR BATHING: A LOT
MOVING FROM LYING ON BACK TO SITTING ON SIDE OF FLAT BED WITH BEDRAILS: A LITTLE
DRESSING REGULAR LOWER BODY CLOTHING: A LOT
PERSONAL GROOMING: A LITTLE
EATING MEALS: A LITTLE
MOVING FROM LYING ON BACK TO SITTING ON SIDE OF FLAT BED WITH BEDRAILS: A LITTLE
MOBILITY SCORE: 13
DRESSING REGULAR UPPER BODY CLOTHING: A LOT
EATING MEALS: A LITTLE
WALKING IN HOSPITAL ROOM: A LOT
DRESSING REGULAR LOWER BODY CLOTHING: A LOT
TURNING FROM BACK TO SIDE WHILE IN FLAT BAD: A LITTLE
MOVING TO AND FROM BED TO CHAIR: A LITTLE
DRESSING REGULAR UPPER BODY CLOTHING: A LITTLE
STANDING UP FROM CHAIR USING ARMS: A LOT
WALKING IN HOSPITAL ROOM: TOTAL
MOVING TO AND FROM BED TO CHAIR: A LITTLE
DAILY ACTIVITIY SCORE: 14

## 2025-03-28 ASSESSMENT — PAIN - FUNCTIONAL ASSESSMENT
PAIN_FUNCTIONAL_ASSESSMENT: PAINAD (PAIN ASSESSMENT IN ADVANCED DEMENTIA SCALE)
PAIN_FUNCTIONAL_ASSESSMENT: 0-10

## 2025-03-28 ASSESSMENT — PAIN SCALES - PAIN ASSESSMENT IN ADVANCED DEMENTIA (PAINAD)
BODYLANGUAGE: RELAXED
FACIALEXPRESSION: SMILING OR INEXPRESSIVE
TOTALSCORE: 0
CONSOLABILITY: NO NEED TO CONSOLE
BREATHING: NORMAL

## 2025-03-28 ASSESSMENT — PAIN SCALES - GENERAL: PAINLEVEL_OUTOF10: 0 - NO PAIN

## 2025-03-28 NOTE — CARE PLAN
The patient's goals for the shift include no falls    The clinical goals for the shift include remain hds

## 2025-03-28 NOTE — CARE PLAN
The patient's goals for the shift include no falls    The clinical goals for the shift include remain safe and HDS this shift

## 2025-03-28 NOTE — PROGRESS NOTES
03/28/25 1611   Discharge Planning   Expected Discharge Disposition SNF     Met with patient and daughter at bedside. Daughter stated that patient is no longer active with hospice and signed necessary documents to note that. Daughter reported that pending pt ot evaluation, patient will either return home to [facility] or go to [facility].SW will follow.

## 2025-03-28 NOTE — CONSULTS
ORTHOPAEDIC CONSULT NOTE    Reason For Consult  Concern for L tibial plateua fracture     History Of Present Illness  Bev Christine is a 89 y.o. female presenting with a history of A-fib not on AC, dementia, who is on hospice ([facility]) residing at assisted living, ambulates with walker at baseline, who presented to the ED on 3/27 for generalized weakness, N/V/D, coughing, and known pneumonia.     The patient is a poor historian and history was obtained from daughter who as at bedside with the patient.     The daughter states that she did not come to ED for orhthopaedic issues. In light of this, she states that he patient did sustain a fall from ground level this past Fall, sustaining a fracture her left medial tibial plateau initially seen at [facility] and treated nonoperatively. She had reportedly done well with only intermittent L knee pain that did not seem to limit her. Recently, she had began to feel ill this past Tuesday (3/25) and did have a fall from ground level at her AL facility while trying to ambulate to the bathroom. She had been complaining of L knee pain after the fall. Daughter reports witnessing the patient ambulate to the bathroom and around the room unassisted since the fall.      Past Medical History  She has no past medical history on file.    Surgical History  She has no past surgical history on file.     Social History  She reports that she has never smoked. She has never used smokeless tobacco. No history on file for alcohol use and drug use.    Family History  No family history on file.     Allergies  Iodinated contrast media, Amlodipine, and Penicillins    Review of Systems    No pertinent symptoms related to systems other than musculoskeletal were investigated      Physical Exam  - Constitutional: No acute distress, cooperative  - Eyes: anicteric  - Head/Neck: normocephalic atraumatic  - Respiratory/Thorax: NWOB  - Cardiovascular: RRR on peripheral palpation  - Gastrointestinal: Nondistended  -  "Psychological: Appropriate mood/behavior  - Skin: Warm and dry. Additional findings in musculoskeletal evaluation  - Musculoskeletal:  ---    L  lower extremity:  - Skin intact   - L knee mild to moderately swollen compared to contralateral side   - Mild TTP over posterior knee joint line   - Compartments soft and compressible  - fires EHL/DF/PF.  - SILT in Jenkins/Sa/SP/DP/T distribution.  - Palpable DP pulse    A full secondary survey was conducted. Patient did not have any acute pain with ROM or palpation of other extremities, other than that previously mentioned.     Last Recorded Vitals  Blood pressure (!) 196/81, pulse 72, temperature 36.3 °C (97.3 °F), temperature source Temporal, resp. rate 18, height 1.499 m (4' 11\"), weight (!) 40.8 kg (90 lb), SpO2 93%.    Laboratory Results  Results for orders placed or performed during the hospital encounter of 03/26/25 (from the past 24 hours)   POCT GLUCOSE   Result Value Ref Range    POCT Glucose 96 74 - 99 mg/dL   CBC   Result Value Ref Range    WBC 7.1 4.4 - 11.3 x10*3/uL    nRBC 0.0 0.0 - 0.0 /100 WBCs    RBC 3.12 (L) 4.00 - 5.20 x10*6/uL    Hemoglobin 9.3 (L) 12.0 - 16.0 g/dL    Hematocrit 28.9 (L) 36.0 - 46.0 %    MCV 93 80 - 100 fL    MCH 29.8 26.0 - 34.0 pg    MCHC 32.2 32.0 - 36.0 g/dL    RDW 13.6 11.5 - 14.5 %    Platelets 195 150 - 450 x10*3/uL   Basic Metabolic Panel   Result Value Ref Range    Glucose 77 74 - 99 mg/dL    Sodium 138 136 - 145 mmol/L    Potassium 3.6 3.5 - 5.3 mmol/L    Chloride 109 (H) 98 - 107 mmol/L    Bicarbonate 22 21 - 32 mmol/L    Anion Gap 11 10 - 20 mmol/L    Urea Nitrogen 23 6 - 23 mg/dL    Creatinine 0.70 0.50 - 1.05 mg/dL    eGFR 79 >60 mL/min/1.73m*2    Calcium 7.7 (L) 8.6 - 10.3 mg/dL        Radiology Results    Xray and CT of L knee personally reviewed showing fracture through the medial tibial plateau with minimal displacement, posterior tibial subluxation. Sclerotic metaphyseal changes indiccative of callus formation given " remote history of fracture.      Xray of L hip and pelvis personally reviewed showing no evidence of acute fracture or dislocation of the pelvis or hips.     Assessment/Plan     Chronic L medial tibial plateau fracture     Recommendations:  - No acute orthopaedic surgical intervention indicated at this time  - Pain management per ED/primary team  - WB status: WBAT, no restrictions   - Orthopedics is signing off.  - Please page with any questions/concerns.    Patient should follow-up with Dr. Bunn as needed if pain persists.     This plan was discussed with attending, Dr. Bunn.    Remberto Smith MD  Orthopedic Surgery, PGY4  P: [phone] (Epic Chat Preferred)

## 2025-03-28 NOTE — PROGRESS NOTES
PALLIATIVE CARE SOCIAL WORK NOTE     Notified that pt was from [facility] and was active with [facility]. I attempted to meet with pt's daughter Lauren yesterday, but she had just left the hospital when I went to the bedside. I spoke with Lauren ([phone]) this afternoon. Was told by Lauren that pt is no longer with [facility] nor does she anticipate that she will return to their care at time of discharge from the hospital. Lauren indicated that she had inquired about her going to SNF at time of discharge. PT/OT have not been able to working with her to see if this is appropriate.  Ortho needs to see first to give recommendations re: weight bearing.    ITA Frank

## 2025-03-29 VITALS
HEIGHT: 59 IN | OXYGEN SATURATION: 93 % | RESPIRATION RATE: 17 BRPM | HEART RATE: 66 BPM | SYSTOLIC BLOOD PRESSURE: 126 MMHG | TEMPERATURE: 98.1 F | BODY MASS INDEX: 18.14 KG/M2 | DIASTOLIC BLOOD PRESSURE: 64 MMHG | WEIGHT: 90 LBS

## 2025-03-29 PROCEDURE — 2500000001 HC RX 250 WO HCPCS SELF ADMINISTERED DRUGS (ALT 637 FOR MEDICARE OP): Performed by: FAMILY MEDICINE

## 2025-03-29 PROCEDURE — 2500000004 HC RX 250 GENERAL PHARMACY W/ HCPCS (ALT 636 FOR OP/ED): Performed by: FAMILY MEDICINE

## 2025-03-29 PROCEDURE — 97165 OT EVAL LOW COMPLEX 30 MIN: CPT | Mod: GO

## 2025-03-29 PROCEDURE — 2500000001 HC RX 250 WO HCPCS SELF ADMINISTERED DRUGS (ALT 637 FOR MEDICARE OP): Performed by: NURSE PRACTITIONER

## 2025-03-29 PROCEDURE — 97530 THERAPEUTIC ACTIVITIES: CPT | Mod: GO

## 2025-03-29 PROCEDURE — 2500000002 HC RX 250 W HCPCS SELF ADMINISTERED DRUGS (ALT 637 FOR MEDICARE OP, ALT 636 FOR OP/ED): Performed by: FAMILY MEDICINE

## 2025-03-29 PROCEDURE — 97161 PT EVAL LOW COMPLEX 20 MIN: CPT | Mod: GP

## 2025-03-29 PROCEDURE — 2500000005 HC RX 250 GENERAL PHARMACY W/O HCPCS: Performed by: FAMILY MEDICINE

## 2025-03-29 RX ORDER — LEVOFLOXACIN 750 MG/1
750 TABLET ORAL ONCE
Qty: 1 TABLET | Refills: 0 | Status: SHIPPED | OUTPATIENT
Start: 2025-03-31 | End: 2025-04-02

## 2025-03-29 RX ORDER — CARVEDILOL 3.12 MG/1
3.12 TABLET ORAL 2 TIMES DAILY
Status: DISCONTINUED | OUTPATIENT
Start: 2025-03-29 | End: 2025-03-29 | Stop reason: HOSPADM

## 2025-03-29 RX ORDER — HYDRALAZINE HYDROCHLORIDE 25 MG/1
25 TABLET, FILM COATED ORAL 3 TIMES DAILY
Status: DISCONTINUED | OUTPATIENT
Start: 2025-03-29 | End: 2025-03-29 | Stop reason: HOSPADM

## 2025-03-29 RX ADMIN — CARVEDILOL 3.12 MG: 3.12 TABLET, FILM COATED ORAL at 10:12

## 2025-03-29 RX ADMIN — HYDRALAZINE HYDROCHLORIDE 25 MG: 25 TABLET ORAL at 10:11

## 2025-03-29 RX ADMIN — HEPARIN SODIUM 5000 UNITS: 5000 INJECTION INTRAVENOUS; SUBCUTANEOUS at 06:46

## 2025-03-29 RX ADMIN — SENNOSIDES 17.2 MG: 8.6 TABLET ORAL at 15:01

## 2025-03-29 RX ADMIN — LIDOCAINE 4% 1 PATCH: 40 PATCH TOPICAL at 10:08

## 2025-03-29 RX ADMIN — HYDRALAZINE HYDROCHLORIDE 25 MG: 25 TABLET ORAL at 14:38

## 2025-03-29 RX ADMIN — HEPARIN SODIUM 5000 UNITS: 5000 INJECTION INTRAVENOUS; SUBCUTANEOUS at 14:38

## 2025-03-29 RX ADMIN — PANTOPRAZOLE SODIUM 40 MG: 40 TABLET, DELAYED RELEASE ORAL at 06:46

## 2025-03-29 RX ADMIN — SERTRALINE HYDROCHLORIDE 25 MG: 50 TABLET ORAL at 10:11

## 2025-03-29 RX ADMIN — LEVOFLOXACIN 750 MG: 750 TABLET, FILM COATED ORAL at 14:38

## 2025-03-29 ASSESSMENT — PAIN SCALES - GENERAL
PAINLEVEL_OUTOF10: 0 - NO PAIN
PAINLEVEL_OUTOF10: 0 - NO PAIN
PAINLEVEL_OUTOF10: 1
PAINLEVEL_OUTOF10: 0 - NO PAIN

## 2025-03-29 ASSESSMENT — COGNITIVE AND FUNCTIONAL STATUS - GENERAL
STANDING UP FROM CHAIR USING ARMS: A LITTLE
DRESSING REGULAR UPPER BODY CLOTHING: A LITTLE
MOVING FROM LYING ON BACK TO SITTING ON SIDE OF FLAT BED WITH BEDRAILS: A LITTLE
TURNING FROM BACK TO SIDE WHILE IN FLAT BAD: A LITTLE
HELP NEEDED FOR BATHING: A LITTLE
WALKING IN HOSPITAL ROOM: A LITTLE
TOILETING: A LITTLE
DRESSING REGULAR UPPER BODY CLOTHING: A LITTLE
TURNING FROM BACK TO SIDE WHILE IN FLAT BAD: A LITTLE
MOVING TO AND FROM BED TO CHAIR: A LITTLE
STANDING UP FROM CHAIR USING ARMS: A LOT
MOVING TO AND FROM BED TO CHAIR: A LITTLE
MOBILITY SCORE: 17
TOILETING: A LITTLE
MOVING FROM LYING ON BACK TO SITTING ON SIDE OF FLAT BED WITH BEDRAILS: A LITTLE
CLIMB 3 TO 5 STEPS WITH RAILING: A LOT
EATING MEALS: A LITTLE
DRESSING REGULAR LOWER BODY CLOTHING: A LOT
DAILY ACTIVITIY SCORE: 19
WALKING IN HOSPITAL ROOM: A LOT
MOBILITY SCORE: 15
CLIMB 3 TO 5 STEPS WITH RAILING: A LOT
DAILY ACTIVITIY SCORE: 16
DRESSING REGULAR LOWER BODY CLOTHING: A LOT
PERSONAL GROOMING: A LITTLE
HELP NEEDED FOR BATHING: A LOT

## 2025-03-29 ASSESSMENT — PAIN SCALES - PAIN ASSESSMENT IN ADVANCED DEMENTIA (PAINAD): TOTALSCORE: MEDICATION (SEE MAR)

## 2025-03-29 ASSESSMENT — PAIN - FUNCTIONAL ASSESSMENT
PAIN_FUNCTIONAL_ASSESSMENT: 0-10

## 2025-03-29 ASSESSMENT — PAIN DESCRIPTION - LOCATION: LOCATION: LEG

## 2025-03-29 NOTE — PROGRESS NOTES
Bev Christine is a 89 y.o. female on day 1 of admission presenting with Closed fracture of left tibial plateau, initial encounter.      Subjective      Feels ok  Pain controlled in the knee  No nausea vomiting diarrhea  No chestp ain     Objective     Last Recorded Vitals  /83 (BP Location: Left arm, Patient Position: Lying)   Pulse 74   Temp 36.8 °C (98.2 °F) (Temporal)   Resp 18   Wt (!) 40.8 kg (90 lb)   SpO2 91%   Intake/Output last 3 Shifts:  No intake or output data in the 24 hours ending 03/29/25 0555    Admission Weight  Weight: (!) 40.8 kg (90 lb) (03/26/25 2215)    Daily Weight  03/26/25 : (!) 40.8 kg (90 lb)    Image Results  CT knee left wo IV contrast  Narrative: Interpreted By:  Curry Dennis,   STUDY:  CT KNEE LEFT WO IV CONTRAST; ;  3/28/2025 4:13 pm      INDICATION:  Signs/Symptoms:concern for fracture.          COMPARISON:  None.      ACCESSION NUMBER(S):  [id number]      ORDERING CLINICIAN:  ALEX OCAMPO      TECHNIQUE:  Serial axial CT images obtained of the left knee. Images reformatted  in the coronal and sagittal projection      All CT examinations are performed with 1 or more of the following  dose reduction techniques: Automated exposure control, adjustment of  mA and/or kv according to patient's size, or use of iterative  reconstruction techniques.      FINDINGS:  Medial femorotibial compartment demonstrates subtle osteochondral  fracture demonstrated along the inner margin of the medial tibial  plateau with sagittally oriented component of fracture.  Characterization of the difficult. There is subtle asymmetric  sclerosis in the medial tibial metaphysis suggesting subtle component  of fracture with subtle lucency noted (series 602, image 35). No  osteochondral impaction demonstrated. Medial femoral condyle  unremarkable.      Lateral femorotibial compartment demonstrates no cortical or  trabecular discontinuity. No osteochondral fragments demonstrated.      There is  chondrocalcinosis of the articular and fibrocartilage in the  medial and lateral compartments. Patellofemoral articulation  demonstrates mild osteoarthritic degenerative change. Extensor  mechanism is unremarkable.      Small knee joint effusion demonstrated. Musculature about the knee  demonstrates generalized atrophy. There is a edema in the inter  fascial planes demonstrated. Small knee joint effusion noted.                      Impression: 1. Subtle transverse fracture demonstrated through the medial tibial  metaphysis with articular extension to the inner margin of the medial  tibial plateau about the tibial eminence. No displaced fracture  fragments or osteochondral step-off.      2. Diffuse osteopenia demonstrated.      3. Small knee joint effusion.          MACRO:  None      Signed by: Curry Dennis 3/28/2025 4:41 PM  Dictation workstation:   [id number]  XR hip left with pelvis when performed 2 or 3 views  Narrative: Interpreted By:  Manda Patricia,   STUDY:  XR HIP LEFT WITH PELVIS WHEN PERFORMED 2 OR 3 VIEWS;  3/28/2025 1:51  pm      INDICATION:  Signs/Symptoms:Fall, left hip pain.      COMPARISON:  08/14/2024      ACCESSION NUMBER(S):  [id number]      ORDERING CLINICIAN:  ALEX OCAMPO      FINDINGS:  Osteopenia is present, which limits bony detail.          Degenerative changes are seen in bilateral SI joints and lower lumbar  spine.  Narrowing of bilateral hip joints is seen with associated  sclerosis and spur formation.      No acute fracture or dislocation is seen.      The pubic symphysis is not a diastased. No lytic or blastic lesions  are seen.          Impression: No acute fracture or dislocation.          Degenerative changes of bilateral hips.      Degenerative changes of bilateral SI joints and lower lumbar spine.      MACRO:  None      Signed by: Manda Patricia 3/28/2025 2:06 PM  Dictation workstation:   [id number]      Physical Exam    Well developed frail thin elderly female sitting in  chair no distress  Chst fair air entry aura   Cvs regular  Ext no edema  Abdo soft nontender bs active no masses  Cns alert appropriate    Relevant Results    Scheduled medications  heparin (porcine), 5,000 Units, subcutaneous, q8h TALHA  levoFLOXacin, 750 mg, oral, q48h  lidocaine, 1 patch, transdermal, Daily  melatonin, 3 mg, oral, Nightly  pantoprazole, 40 mg, oral, Daily before breakfast   Or  pantoprazole, 40 mg, intravenous, Daily before breakfast  [Held by provider] sennosides, 2 tablet, oral, BID  sertraline, 25 mg, oral, Daily      Continuous medications     PRN medications  PRN medications: acetaminophen **OR** acetaminophen **OR** acetaminophen, acetaminophen **OR** acetaminophen **OR** acetaminophen, guaiFENesin, metoprolol, ondansetron **OR** ondansetron  Results for orders placed or performed during the hospital encounter of 03/26/25 (from the past 96 hours)   Electrocardiogram, 12-lead PRN ACS symptoms   Result Value Ref Range    Ventricular Rate 159 BPM    QRS Duration 78 ms    QT Interval 292 ms    QTC Calculation(Bazett) 474 ms    R Axis 261 degrees    T Axis 86 degrees    QRS Count 27 beats    Q Onset 219 ms    T Offset 365 ms    QTC Fredericia 403 ms   CBC and Auto Differential   Result Value Ref Range    WBC 18.1 (H) 4.4 - 11.3 x10*3/uL    nRBC 0.0 0.0 - 0.0 /100 WBCs    RBC 3.34 (L) 4.00 - 5.20 x10*6/uL    Hemoglobin 10.0 (L) 12.0 - 16.0 g/dL    Hematocrit 33.1 (L) 36.0 - 46.0 %    MCV 99 80 - 100 fL    MCH 29.9 26.0 - 34.0 pg    MCHC 30.2 (L) 32.0 - 36.0 g/dL    RDW 13.8 11.5 - 14.5 %    Platelets 227 150 - 450 x10*3/uL    Neutrophils % 91.1 40.0 - 80.0 %    Immature Granulocytes %, Automated 0.8 0.0 - 0.9 %    Lymphocytes % 4.6 13.0 - 44.0 %    Monocytes % 3.2 2.0 - 10.0 %    Eosinophils % 0.1 0.0 - 6.0 %    Basophils % 0.2 0.0 - 2.0 %    Neutrophils Absolute 16.47 (H) 1.60 - 5.50 x10*3/uL    Immature Granulocytes Absolute, Automated 0.14 0.00 - 0.50 x10*3/uL    Lymphocytes Absolute 0.83 0.80 -  3.00 x10*3/uL    Monocytes Absolute 0.57 0.05 - 0.80 x10*3/uL    Eosinophils Absolute 0.02 0.00 - 0.40 x10*3/uL    Basophils Absolute 0.03 0.00 - 0.10 x10*3/uL   Magnesium   Result Value Ref Range    Magnesium 1.61 1.60 - 2.40 mg/dL   Comprehensive metabolic panel   Result Value Ref Range    Glucose 209 (H) 74 - 99 mg/dL    Sodium 137 136 - 145 mmol/L    Potassium 4.4 3.5 - 5.3 mmol/L    Chloride 108 (H) 98 - 107 mmol/L    Bicarbonate 15 (L) 21 - 32 mmol/L    Anion Gap 18 10 - 20 mmol/L    Urea Nitrogen 26 (H) 6 - 23 mg/dL    Creatinine 0.76 0.50 - 1.05 mg/dL    eGFR 72 >60 mL/min/1.73m*2    Calcium 7.7 (L) 8.6 - 10.3 mg/dL    Albumin 2.9 (L) 3.4 - 5.0 g/dL    Alkaline Phosphatase 49 33 - 136 U/L    Total Protein 5.7 (L) 6.4 - 8.2 g/dL    AST 58 (H) 9 - 39 U/L    Bilirubin, Total 0.7 0.0 - 1.2 mg/dL    ALT 39 7 - 45 U/L   Troponin I, High Sensitivity   Result Value Ref Range    Troponin I, High Sensitivity 94 (HH) 0 - 13 ng/L   Basic metabolic panel   Result Value Ref Range    Glucose 112 (H) 74 - 99 mg/dL    Sodium 139 136 - 145 mmol/L    Potassium 4.2 3.5 - 5.3 mmol/L    Chloride 107 98 - 107 mmol/L    Bicarbonate 25 21 - 32 mmol/L    Anion Gap 11 10 - 20 mmol/L    Urea Nitrogen 31 (H) 6 - 23 mg/dL    Creatinine 0.77 0.50 - 1.05 mg/dL    eGFR 71 >60 mL/min/1.73m*2    Calcium 8.6 8.6 - 10.3 mg/dL   CBC   Result Value Ref Range    WBC 13.7 (H) 4.4 - 11.3 x10*3/uL    nRBC 0.0 0.0 - 0.0 /100 WBCs    RBC 3.34 (L) 4.00 - 5.20 x10*6/uL    Hemoglobin 10.2 (L) 12.0 - 16.0 g/dL    Hematocrit 31.3 (L) 36.0 - 46.0 %    MCV 94 80 - 100 fL    MCH 30.5 26.0 - 34.0 pg    MCHC 32.6 32.0 - 36.0 g/dL    RDW 13.7 11.5 - 14.5 %    Platelets 254 150 - 450 x10*3/uL   Urinalysis with Reflex Microscopic   Result Value Ref Range    Color, Urine Yellow Light-Yellow, Yellow, Dark-Yellow    Appearance, Urine Clear Clear    Specific Gravity, Urine 1.031 1.005 - 1.035    pH, Urine 6.0 5.0, 5.5, 6.0, 6.5, 7.0, 7.5, 8.0    Protein, Urine 50 (1+)  (A) NEGATIVE, 10 (TRACE), 20 (TRACE) mg/dL    Glucose, Urine Normal Normal mg/dL    Blood, Urine NEGATIVE NEGATIVE mg/dL    Ketones, Urine NEGATIVE NEGATIVE mg/dL    Bilirubin, Urine NEGATIVE NEGATIVE mg/dL    Urobilinogen, Urine Normal Normal mg/dL    Nitrite, Urine NEGATIVE NEGATIVE    Leukocyte Esterase, Urine 25 Johanne/uL (A) NEGATIVE   Microscopic Only, Urine   Result Value Ref Range    WBC, Urine 6-10 (A) 1-5, NONE /HPF    RBC, Urine NONE NONE, 1-2, 3-5 /HPF    Squamous Epithelial Cells, Urine 1-9 (SPARSE) Reference range not established. /HPF    Mucus, Urine 2+ Reference range not established. /LPF    Hyaline Casts, Urine 1+ (A) NONE /LPF   POCT GLUCOSE   Result Value Ref Range    POCT Glucose 96 74 - 99 mg/dL   CBC   Result Value Ref Range    WBC 7.1 4.4 - 11.3 x10*3/uL    nRBC 0.0 0.0 - 0.0 /100 WBCs    RBC 3.12 (L) 4.00 - 5.20 x10*6/uL    Hemoglobin 9.3 (L) 12.0 - 16.0 g/dL    Hematocrit 28.9 (L) 36.0 - 46.0 %    MCV 93 80 - 100 fL    MCH 29.8 26.0 - 34.0 pg    MCHC 32.2 32.0 - 36.0 g/dL    RDW 13.6 11.5 - 14.5 %    Platelets 195 150 - 450 x10*3/uL   Basic Metabolic Panel   Result Value Ref Range    Glucose 77 74 - 99 mg/dL    Sodium 138 136 - 145 mmol/L    Potassium 3.6 3.5 - 5.3 mmol/L    Chloride 109 (H) 98 - 107 mmol/L    Bicarbonate 22 21 - 32 mmol/L    Anion Gap 11 10 - 20 mmol/L    Urea Nitrogen 23 6 - 23 mg/dL    Creatinine 0.70 0.50 - 1.05 mg/dL    eGFR 79 >60 mL/min/1.73m*2    Calcium 7.7 (L) 8.6 - 10.3 mg/dL                Assessment/Plan                  Assessment & Plan  Closed fracture of left tibial plateau, initial encounter    Atrial fibrillation (Multi)    Dementia    Pneumonia poa    Will cont with levaquin  Noted input from ortho   Wbat  Ot and pt   Dc planning   Can finish the course of levaquin orally              Jorge Britt MD

## 2025-03-29 NOTE — PROGRESS NOTES
Bev Christine is a 89 y.o. female on day 1 of admission presenting with Closed fracture of left tibial plateau, initial encounter.      Subjective      Feels ok  Pain controlled in the knee  No nausea vomiting diarrhea  No chestp ain   Did walk with therapy     Objective     Last Recorded Vitals  /72 (BP Location: Right arm)   Pulse 63   Temp 36.7 °C (98.1 °F) (Temporal)   Resp 17   Wt (!) 40.8 kg (90 lb)   SpO2 93%   Intake/Output last 3 Shifts:    Intake/Output Summary (Last 24 hours) at 3/29/2025 1503  Last data filed at 3/29/2025 1052  Gross per 24 hour   Intake --   Output 250 ml   Net -250 ml       Admission Weight  Weight: (!) 40.8 kg (90 lb) (03/26/25 2215)    Daily Weight  03/26/25 : (!) 40.8 kg (90 lb)    Image Results  CT knee left wo IV contrast  Narrative: Interpreted By:  Curry Dennis,   STUDY:  CT KNEE LEFT WO IV CONTRAST; ;  3/28/2025 4:13 pm      INDICATION:  Signs/Symptoms:concern for fracture.          COMPARISON:  None.      ACCESSION NUMBER(S):  [id number]      ORDERING CLINICIAN:  ALEX OCAMPO      TECHNIQUE:  Serial axial CT images obtained of the left knee. Images reformatted  in the coronal and sagittal projection      All CT examinations are performed with 1 or more of the following  dose reduction techniques: Automated exposure control, adjustment of  mA and/or kv according to patient's size, or use of iterative  reconstruction techniques.      FINDINGS:  Medial femorotibial compartment demonstrates subtle osteochondral  fracture demonstrated along the inner margin of the medial tibial  plateau with sagittally oriented component of fracture.  Characterization of the difficult. There is subtle asymmetric  sclerosis in the medial tibial metaphysis suggesting subtle component  of fracture with subtle lucency noted (series 602, image 35). No  osteochondral impaction demonstrated. Medial femoral condyle  unremarkable.      Lateral femorotibial compartment demonstrates no  cortical or  trabecular discontinuity. No osteochondral fragments demonstrated.      There is chondrocalcinosis of the articular and fibrocartilage in the  medial and lateral compartments. Patellofemoral articulation  demonstrates mild osteoarthritic degenerative change. Extensor  mechanism is unremarkable.      Small knee joint effusion demonstrated. Musculature about the knee  demonstrates generalized atrophy. There is a edema in the inter  fascial planes demonstrated. Small knee joint effusion noted.                      Impression: 1. Subtle transverse fracture demonstrated through the medial tibial  metaphysis with articular extension to the inner margin of the medial  tibial plateau about the tibial eminence. No displaced fracture  fragments or osteochondral step-off.      2. Diffuse osteopenia demonstrated.      3. Small knee joint effusion.          MACRO:  None      Signed by: Curry Dennis 3/28/2025 4:41 PM  Dictation workstation:   [id number]  XR hip left with pelvis when performed 2 or 3 views  Narrative: Interpreted By:  Manda Patricia,   STUDY:  XR HIP LEFT WITH PELVIS WHEN PERFORMED 2 OR 3 VIEWS;  3/28/2025 1:51  pm      INDICATION:  Signs/Symptoms:Fall, left hip pain.      COMPARISON:  08/14/2024      ACCESSION NUMBER(S):  [id number]      ORDERING CLINICIAN:  ALEX OCAMPO      FINDINGS:  Osteopenia is present, which limits bony detail.          Degenerative changes are seen in bilateral SI joints and lower lumbar  spine.  Narrowing of bilateral hip joints is seen with associated  sclerosis and spur formation.      No acute fracture or dislocation is seen.      The pubic symphysis is not a diastased. No lytic or blastic lesions  are seen.          Impression: No acute fracture or dislocation.          Degenerative changes of bilateral hips.      Degenerative changes of bilateral SI joints and lower lumbar spine.      MACRO:  None      Signed by: Manda Patricia 3/28/2025 2:06 PM  Dictation  workstation:   [id number]      Physical Exam    Well developed frail thin elderly female sitting in chair no distress  Chst fair air entry aura   Cvs regular  Ext no edema  Abdo soft nontender bs active no masses  Cns alert appropriate    Relevant Results    Scheduled medications  carvedilol, 3.125 mg, oral, BID  heparin (porcine), 5,000 Units, subcutaneous, q8h TALHA  hydrALAZINE, 25 mg, oral, TID  levoFLOXacin, 750 mg, oral, q48h  lidocaine, 1 patch, transdermal, Daily  melatonin, 3 mg, oral, Nightly  pantoprazole, 40 mg, oral, Daily before breakfast   Or  pantoprazole, 40 mg, intravenous, Daily before breakfast  sennosides, 2 tablet, oral, BID  sertraline, 25 mg, oral, Daily      Continuous medications     PRN medications  PRN medications: acetaminophen **OR** acetaminophen **OR** acetaminophen, acetaminophen **OR** acetaminophen **OR** acetaminophen, guaiFENesin, metoprolol, ondansetron **OR** ondansetron  Results for orders placed or performed during the hospital encounter of 03/26/25 (from the past 96 hours)   Electrocardiogram, 12-lead PRN ACS symptoms   Result Value Ref Range    Ventricular Rate 159 BPM    QRS Duration 78 ms    QT Interval 292 ms    QTC Calculation(Bazett) 474 ms    R Axis 261 degrees    T Axis 86 degrees    QRS Count 27 beats    Q Onset 219 ms    T Offset 365 ms    QTC Fredericia 403 ms   CBC and Auto Differential   Result Value Ref Range    WBC 18.1 (H) 4.4 - 11.3 x10*3/uL    nRBC 0.0 0.0 - 0.0 /100 WBCs    RBC 3.34 (L) 4.00 - 5.20 x10*6/uL    Hemoglobin 10.0 (L) 12.0 - 16.0 g/dL    Hematocrit 33.1 (L) 36.0 - 46.0 %    MCV 99 80 - 100 fL    MCH 29.9 26.0 - 34.0 pg    MCHC 30.2 (L) 32.0 - 36.0 g/dL    RDW 13.8 11.5 - 14.5 %    Platelets 227 150 - 450 x10*3/uL    Neutrophils % 91.1 40.0 - 80.0 %    Immature Granulocytes %, Automated 0.8 0.0 - 0.9 %    Lymphocytes % 4.6 13.0 - 44.0 %    Monocytes % 3.2 2.0 - 10.0 %    Eosinophils % 0.1 0.0 - 6.0 %    Basophils % 0.2 0.0 - 2.0 %    Neutrophils  Absolute 16.47 (H) 1.60 - 5.50 x10*3/uL    Immature Granulocytes Absolute, Automated 0.14 0.00 - 0.50 x10*3/uL    Lymphocytes Absolute 0.83 0.80 - 3.00 x10*3/uL    Monocytes Absolute 0.57 0.05 - 0.80 x10*3/uL    Eosinophils Absolute 0.02 0.00 - 0.40 x10*3/uL    Basophils Absolute 0.03 0.00 - 0.10 x10*3/uL   Magnesium   Result Value Ref Range    Magnesium 1.61 1.60 - 2.40 mg/dL   Comprehensive metabolic panel   Result Value Ref Range    Glucose 209 (H) 74 - 99 mg/dL    Sodium 137 136 - 145 mmol/L    Potassium 4.4 3.5 - 5.3 mmol/L    Chloride 108 (H) 98 - 107 mmol/L    Bicarbonate 15 (L) 21 - 32 mmol/L    Anion Gap 18 10 - 20 mmol/L    Urea Nitrogen 26 (H) 6 - 23 mg/dL    Creatinine 0.76 0.50 - 1.05 mg/dL    eGFR 72 >60 mL/min/1.73m*2    Calcium 7.7 (L) 8.6 - 10.3 mg/dL    Albumin 2.9 (L) 3.4 - 5.0 g/dL    Alkaline Phosphatase 49 33 - 136 U/L    Total Protein 5.7 (L) 6.4 - 8.2 g/dL    AST 58 (H) 9 - 39 U/L    Bilirubin, Total 0.7 0.0 - 1.2 mg/dL    ALT 39 7 - 45 U/L   Troponin I, High Sensitivity   Result Value Ref Range    Troponin I, High Sensitivity 94 (HH) 0 - 13 ng/L   Basic metabolic panel   Result Value Ref Range    Glucose 112 (H) 74 - 99 mg/dL    Sodium 139 136 - 145 mmol/L    Potassium 4.2 3.5 - 5.3 mmol/L    Chloride 107 98 - 107 mmol/L    Bicarbonate 25 21 - 32 mmol/L    Anion Gap 11 10 - 20 mmol/L    Urea Nitrogen 31 (H) 6 - 23 mg/dL    Creatinine 0.77 0.50 - 1.05 mg/dL    eGFR 71 >60 mL/min/1.73m*2    Calcium 8.6 8.6 - 10.3 mg/dL   CBC   Result Value Ref Range    WBC 13.7 (H) 4.4 - 11.3 x10*3/uL    nRBC 0.0 0.0 - 0.0 /100 WBCs    RBC 3.34 (L) 4.00 - 5.20 x10*6/uL    Hemoglobin 10.2 (L) 12.0 - 16.0 g/dL    Hematocrit 31.3 (L) 36.0 - 46.0 %    MCV 94 80 - 100 fL    MCH 30.5 26.0 - 34.0 pg    MCHC 32.6 32.0 - 36.0 g/dL    RDW 13.7 11.5 - 14.5 %    Platelets 254 150 - 450 x10*3/uL   Urinalysis with Reflex Microscopic   Result Value Ref Range    Color, Urine Yellow Light-Yellow, Yellow, Dark-Yellow     Appearance, Urine Clear Clear    Specific Gravity, Urine 1.031 1.005 - 1.035    pH, Urine 6.0 5.0, 5.5, 6.0, 6.5, 7.0, 7.5, 8.0    Protein, Urine 50 (1+) (A) NEGATIVE, 10 (TRACE), 20 (TRACE) mg/dL    Glucose, Urine Normal Normal mg/dL    Blood, Urine NEGATIVE NEGATIVE mg/dL    Ketones, Urine NEGATIVE NEGATIVE mg/dL    Bilirubin, Urine NEGATIVE NEGATIVE mg/dL    Urobilinogen, Urine Normal Normal mg/dL    Nitrite, Urine NEGATIVE NEGATIVE    Leukocyte Esterase, Urine 25 Johanne/uL (A) NEGATIVE   Microscopic Only, Urine   Result Value Ref Range    WBC, Urine 6-10 (A) 1-5, NONE /HPF    RBC, Urine NONE NONE, 1-2, 3-5 /HPF    Squamous Epithelial Cells, Urine 1-9 (SPARSE) Reference range not established. /HPF    Mucus, Urine 2+ Reference range not established. /LPF    Hyaline Casts, Urine 1+ (A) NONE /LPF   POCT GLUCOSE   Result Value Ref Range    POCT Glucose 96 74 - 99 mg/dL   CBC   Result Value Ref Range    WBC 7.1 4.4 - 11.3 x10*3/uL    nRBC 0.0 0.0 - 0.0 /100 WBCs    RBC 3.12 (L) 4.00 - 5.20 x10*6/uL    Hemoglobin 9.3 (L) 12.0 - 16.0 g/dL    Hematocrit 28.9 (L) 36.0 - 46.0 %    MCV 93 80 - 100 fL    MCH 29.8 26.0 - 34.0 pg    MCHC 32.2 32.0 - 36.0 g/dL    RDW 13.6 11.5 - 14.5 %    Platelets 195 150 - 450 x10*3/uL   Basic Metabolic Panel   Result Value Ref Range    Glucose 77 74 - 99 mg/dL    Sodium 138 136 - 145 mmol/L    Potassium 3.6 3.5 - 5.3 mmol/L    Chloride 109 (H) 98 - 107 mmol/L    Bicarbonate 22 21 - 32 mmol/L    Anion Gap 11 10 - 20 mmol/L    Urea Nitrogen 23 6 - 23 mg/dL    Creatinine 0.70 0.50 - 1.05 mg/dL    eGFR 79 >60 mL/min/1.73m*2    Calcium 7.7 (L) 8.6 - 10.3 mg/dL                Assessment/Plan                  Assessment & Plan  Closed fracture of left tibial plateau, initial encounter    Atrial fibrillation (Multi)    Dementia    Pneumonia poa    Will cont with levaquin  Noted input from ortho   Wbat  Ot and pt   Dc planning   Can finish the course of levaquin orally              Jorge Britt,  MD

## 2025-03-29 NOTE — PROGRESS NOTES
Occupational Therapy  Evaluation/Treatment    Patient Name: Bev Christine  MRN: [id number]  : 1928  Today's Date: 25  Time Calculation  Start Time: 1018  Stop Time: 1044  Time Calculation (min): 26 min       Assessment:  OT Assessment: Pt demonstrates to be functioning close to baseline. OT to address generalized weakness and safety with ADLs. Recommend d/c to detention with in house therapy  Prognosis: Excellent  Barriers to Discharge Home: No anticipated barriers  Evaluation/Treatment Tolerance: Patient tolerated treatment well  Medical Staff Made Aware: Yes  End of Session Communication: Bedside nurse  End of Session Patient Position: Up in chair, Alarm on  OT Assessment Results: Decreased ADL status  Prognosis: Excellent  Evaluation/Treatment Tolerance: Patient tolerated treatment well  Medical Staff Made Aware: Yes  Strengths: Premorbid level of function, Attitude of self  Barriers to Participation: Comorbidities  Plan:  Treatment Interventions: ADL retraining, Functional transfer training, Patient/family training  OT Frequency: 3 times per week  OT Discharge Recommendations: Low intensity level of continued care  OT Recommended Transfer Status: Minimal assist, Stand by assist, Assist of 1  OT - OK to Discharge: Yes  Treatment Interventions: ADL retraining, Functional transfer training, Patient/family training    Subjective   Current Problem:  1. Closed fracture of left tibial plateau, initial encounter        2. General weakness        3. Atrial fibrillation with RVR (Multi)          General:   OT Received On: 25  General  Reason for Referral: 89 y.o. F presneting s/p fall and discovered to have chronic L medial tibial plateau fx.  Referred By: ANDREAS SALINAS)  Past Medical History Relevant to Rehab: HTN, dementia  Co-Treatment: PT  Co-Treatment Reason: Co-evaluation to maximize pt safety give tib plat. fx  Prior to Session Communication: Bedside nurse  Patient Position Received: Bed, 3 rail up,  Alarm on  General Comment: Cleared and agreeable to participate in OT.  Precautions:  LE Weight Bearing Status: Weight Bearing as Tolerated (LLE)  Medical Precautions: Fall precautions     Date/Time Vitals Session Patient Position Pulse Resp SpO2 BP MAP (mmHg)    03/29/25 1200 --  --  63  17  93 %  148/72  95                 Pain:  Pain Assessment  Pain Assessment: 0-10  0-10 (Numeric) Pain Score: 0 - No pain    Objective   Cognition:  Overall Cognitive Status: Impaired at baseline (h/o dementia)  Orientation Level: Disoriented to situation  Insight: Mild  Impulsive: Within functional limits             Home Living:  Type of Home: Assisted living  Lives With: Alone  Home Adaptive Equipment: Walker rolling or standard, Wheelchair-manual  Home Layout: One level  Home Access: Level entry    Prior Function:  Level of [place]: Needs assistance with ADLs, Needs assistance with homemaking  Receives Help From:  (Facility staff)  ADL Assistance:  (Pt reports PRN assistance with dressing and bathing, but mostly able to complete IND. Pt reports IND toileting)  Homemaking Assistance: Needs assistance  Ambulatory Assistance: Independent (Mod IND using rollator. pt reports limited ambulation outside of immediate SARITA unit)  Prior Function Comments: Pt is a ?historian regarding PLOF and home s/u.    ADL:  Grooming Assistance: Stand by  LE Dressing Assistance: Maximal  LE Dressing Deficit: Thread RLE into underwear, Thread LLE into underwear  Toileting Assistance with Device: Minimal  Toileting Deficit: Bedside commode      Activities of Daily Living: Grooming  Grooming Level of Assistance: Setup  Grooming Where Assessed: Chair    LE Dressing  LE Dressing: Yes  Adult Briefs Level of Assistance: Maximum assistance  LE Dressing Where Assessed: Bedside commode  LE Dressing Comments: Assisted to thread onto legs this date    Toileting  Toileting Level of Assistance: Contact guard  Where Assessed: Bedside commode  Toileting  Comments: Pulling pants down/over hips and personal hygiene standing    Activity Tolerance:  Endurance: Tolerates 10 - 20 min exercise with multiple rests    Functional Standing Tolerance:  Time: 2-3  Activity: toileting task    Bed Mobility/Transfers: Bed Mobility  Bed Mobility: Yes  Bed Mobility 1  Bed Mobility 1: Supine to sitting  Level of Assistance 1: Close supervision  Bed Mobility Comments 1: HOB elevated    Transfers  Transfer: Yes  Transfer 1  Technique 1: Sit to stand, Stand to sit  Transfer Device 1: Walker  Transfer Level of Assistance 1: Contact guard, Minimal verbal cues  Trials/Comments 1: cues for hand placement  Transfers 2  Transfer From 2: Bed to  Transfer to 2: Commode-standard  Technique 2: To right  Transfer Device 2: Walker  Transfer Level of Assistance 2: Close supervision    Functional Mobility:  Functional Mobility  Functional Mobility Performed: Yes  Functional Mobility 1  Surface 1: Level tile  Device 1: Rolling walker  Assistance 1: Contact guard  Comments 1: ~3-4ft  Sitting Balance:  Static Sitting Balance  Static Sitting-Balance Support: Feet supported  Static Sitting-Level of Assistance: Independent  Dynamic Sitting Balance  Dynamic Sitting-Balance Support: Feet supported  Dynamic Sitting-Level of Assistance: Independent  Standing Balance:  Static Standing Balance  Static Standing-Balance Support: Bilateral upper extremity supported  Static Standing-Level of Assistance: Close supervision  Dynamic Standing Balance  Dynamic Standing-Balance Support: Bilateral upper extremity supported  Dynamic Standing-Level of Assistance: Close supervision, Contact guard      Coordination:  Movements are Fluid and Coordinated: Yes     Extremities: RUE   RUE : Within Functional Limits and LUE   LUE: Within Functional Limits    Outcome Measures: [facility] Daily Activity  Putting on and taking off regular lower body clothing: A lot  Bathing (including washing, rinsing, drying): A little  Putting on and  taking off regular upper body clothing: A little  Toileting, which includes using toilet, bedpan or urinal: A little  Taking care of personal grooming such as brushing teeth: None  Eating Meals: None  Daily Activity - Total Score: 19        Education Documentation  ADL Training, taught by Lennie Petersen OT at 3/29/2025 12:50 PM.  Learner: Patient  Readiness: Acceptance  Method: Explanation, Demonstration  Response: Demonstrated Understanding, Needs Reinforcement  Comment: Educated on safe transfers, purpose of OT    Education Comments  No comments found.          Goals:  Encounter Problems       Encounter Problems (Active)       Dressings Lower Extremities       LTG - Patient will dress lower body Min A       Start:  03/29/25    Expected End:  04/12/25               Functional Balance       LTG - Patient will maintain standing balance SUP       Start:  03/29/25    Expected End:  04/12/25               Toileting       LTG - Patient will complete daily toileting tasks SUP       Start:  03/29/25    Expected End:  04/12/25

## 2025-03-29 NOTE — CARE PLAN
The patient's goals for the shift include no falls    The clinical goals for the shift include pt will remain safe and comfortable throughout shift    Problem: Pain - Adult  Goal: Verbalizes/displays adequate comfort level or baseline comfort level  Outcome: Progressing     Problem: Safety - Adult  Goal: Free from fall injury  Outcome: Progressing     Problem: Discharge Planning  Goal: Discharge to home or other facility with appropriate resources  Outcome: Progressing     Problem: Chronic Conditions and Co-morbidities  Goal: Patient's chronic conditions and co-morbidity symptoms are monitored and maintained or improved  Outcome: Progressing     Problem: Nutrition  Goal: Nutrient intake appropriate for maintaining nutritional needs  Outcome: Progressing     Problem: Skin  Goal: Decreased wound size/increased tissue granulation at next dressing change  Outcome: Progressing  Goal: Participates in plan/prevention/treatment measures  Outcome: Progressing  Goal: Prevent/manage excess moisture  Outcome: Progressing  Goal: Prevent/minimize sheer/friction injuries  Outcome: Progressing  Goal: Promote/optimize nutrition  Outcome: Progressing  Goal: Promote skin healing  Outcome: Progressing     Problem: Fall/Injury  Goal: Not fall by end of shift  Outcome: Progressing  Goal: Be free from injury by end of the shift  Outcome: Progressing  Goal: Verbalize understanding of personal risk factors for fall in the hospital  Outcome: Progressing  Goal: Verbalize understanding of risk factor reduction measures to prevent injury from fall in the home  Outcome: Progressing  Goal: Use assistive devices by end of the shift  Outcome: Progressing  Goal: Pace activities to prevent fatigue by end of the shift  Outcome: Progressing     Problem: Pain  Goal: Takes deep breaths with improved pain control throughout the shift  Outcome: Progressing  Goal: Turns in bed with improved pain control throughout the shift  Outcome: Progressing  Goal:  Walks with improved pain control throughout the shift  Outcome: Progressing  Goal: Performs ADL's with improved pain control throughout shift  Outcome: Progressing  Goal: Participates in PT with improved pain control throughout the shift  Outcome: Progressing  Goal: Free from opioid side effects throughout the shift  Outcome: Progressing  Goal: Free from acute confusion related to pain meds throughout the shift  Outcome: Progressing

## 2025-03-29 NOTE — PROGRESS NOTES
Physical Therapy    Physical Therapy Evaluation    Patient Name: Bev Christine  MRN: [id number]  Department: [facility] [id number]  Room: [address] [place] [address]  Today's Date: 3/29/2025   Time Calculation  Start Time: 1019  Stop Time: 1041  Time Calculation (min): 22 min    Assessment/Plan   PT Assessment  PT Assessment Results: Decreased strength, Decreased endurance, Impaired balance, Decreased mobility, Decreased safety awareness  Rehab Prognosis: Good  Barriers to Discharge Home: No anticipated barriers  Evaluation/Treatment Tolerance: Patient tolerated treatment well  Medical Staff Made Aware: Yes  Strengths: Ability to acquire knowledge  Barriers to Participation:  (None identified)  End of Session Communication: Bedside nurse  Assessment Comment: Pt presents today with decreased LE strength, balance, and endurance. Pt would benefit from continued PT to progress balance and gait training to reduce pt's risk of falls at D/C. Pt is anticipated to benefit from low intensity therapy at D/C.  End of Session Patient Position: Up in chair, Alarm on  IP OR SWING BED PT PLAN  Inpatient or Swing Bed: Inpatient  PT Plan  Treatment/Interventions: Bed mobility, Transfer training, Gait training, Balance training, Strengthening, Endurance training, Therapeutic exercise, Therapeutic activity, Home exercise program, Positioning  PT Plan: Ongoing PT  PT Frequency: 3 times per week  PT Discharge Recommendations: Low intensity level of continued care  Equipment Recommended upon Discharge: Wheeled walker  PT Recommended Transfer Status: Contact guard, Assistive device  PT - OK to Discharge: Yes (PT POC initiated today)    Subjective   General Visit Information:  General  Reason for Referral: 89 y.o. F presneting s/p fall and discovered to have chronic L medial tibial plateau fx.  Referred By: ANDREAS SALINAS)  Past Medical History Relevant to Rehab: No past medical history on file.  Family/Caregiver Present: No  Co-Treatment: OT  Co-Treatment Reason:  Co-evaluation with OT to maximize pt safety and participation.  Prior to Session Communication: Bedside nurse  Patient Position Received: Bed, 3 rail up, Alarm on  Preferred Learning Style: auditory, kinesthetic, visual  General Comment: Pt supine in bed upon PT arrival. Cleared to participate, pleasant, and agreeable to co-evaluation with PT/OT.  Home Living:  Home Living  Type of Home: Assisted living  Lives With: Alone  Home Adaptive Equipment: Walker rolling or standard  Home Layout: One level  Home Access: Level entry  Prior Level of Function:  Prior Function Per Pt/Caregiver Report  Level of [place]: Needs assistance with ADLs, Needs assistance with homemaking  Receives Help From:  (Facility staff)  ADL Assistance:  (Pt reports PRN assistance with dressing and bathing, but mostly able to complete IND. Pt reports IND toileting)  Homemaking Assistance: Needs assistance  Meal Prep:  (Pt transported to facility dining polanco by staff via WC)  Ambulatory Assistance: Independent (Mod IND using rollator. pt reports limited ambulation outside of immediate SARITA unit)  Precautions:  Precautions  Medical Precautions: Fall precautions     Objective   Pain:  Pain Assessment  Pain Assessment: 0-10  0-10 (Numeric) Pain Score: 0 - No pain  Cognition:  Cognition  Orientation Level: Disoriented to time (Pt able to state the correct year)  Insight: Mild  Impulsive: Within functional limits    General Assessments:  Activity Tolerance  Endurance: Tolerates 10 - 20 min exercise with multiple rests    Sensation  Sensation Comment: Appears intact    Coordination  Movements are Fluid and Coordinated: Yes    Postural Control  Postural Control: Impaired  Head Control: Forward head posture  Trunk Control: Trunk flexion in standing  Posture Comment: Increased thoracic kyphosis    Static Sitting Balance  Static Sitting-Balance Support: Bilateral upper extremity supported, Feet supported  Static Sitting-Level of Assistance: Close  supervision  Static Sitting-Comment/Number of Minutes: At the EOB    Static Standing Balance  Static Standing-Balance Support: Bilateral upper extremity supported, No upper extremity supported  Static Standing-Level of Assistance: Contact guard  Static Standing-Comment/Number of Minutes: +Gait belt. Predominantly with FWW support. Pt demonstrates standing balance without UE support when reaching to pull up brief with CGA/gait belt.  Dynamic Standing Balance  Dynamic Standing-Balance Support: Bilateral upper extremity supported  Dynamic Standing-Level of Assistance: Contact guard  Dynamic Standing-Balance: Turning  Dynamic Standing-Comments: +Gait belt with FWW support  Functional Assessments:  Bed Mobility  Bed Mobility: Yes  Bed Mobility 1  Bed Mobility 1: Supine to sitting  Level of Assistance 1: Close supervision  Bed Mobility Comments 1: HOB elevated. Pt able to progress BLE off the EOB and use UE push on the bed to assist trunk into upright sitting.    Transfers  Transfer: Yes  Transfer 1  Transfer From 1: Bed to  Transfer to 1: Commode-standard  Technique 1: Stand pivot  Transfer Device 1: Walker, Gait belt  Transfer Level of Assistance 1: Contact guard, Minimal verbal cues, Minimal tactile cues  Trials/Comments 1: pt attempts to rest B hands on FWW to stand. VC for B hand placement on the bed for BUE push to stand. Pt demonstrate good BLE positioning against the commode before attempting to sit/ VC/TC for BUE reach for the armrests of the commmode when sitting.  Transfers 2  Transfer From 2: Commode-standard to  Transfer to 2: Chair with arms  Technique 2: Stand pivot  Transfer Device 2: Walker, Gait belt  Transfer Level of Assistance 2: Contact guard, Minimal verbal cues, Minimal tactile cues  Trials/Comments 2: VC/TC for BUE push from the armrests of the commode when attempting to stand. Pt able to  a reasonable amount of time. Pt demonstrates good BLE positioning against the chair before attempting  to sit. VC/TC for BUE reach for the armrests of the chair for controlled descent with return demonstration. Pt demonstrates slightly decreased trunk control on descent.    Ambulation/Gait Training  Ambulation/Gait Training Performed: Yes  Ambulation/Gait Training 1  Surface 1: Level tile  Device 1: Rolling walker  Gait Support Devices: Gait belt  Assistance 1: Contact guard  Quality of Gait 1: Narrow base of support, Decreased step length, Forward flexed posture  Comments/Distance (ft) 1: 3 steps x 1, 4 steps x 1 in transition from bed to commode to chair. Pt demonstrates step-to pattern, decreased amairani, and gaze directed toward the ground.    Stairs  Stairs: No  Extremity/Trunk Assessments:  RLE   RLE : Exceptions to WFL  Strength RLE  R Hip Flexion: 3+/5  R Knee Extension: 4-/5  R Ankle Dorsiflexion: 4/5  R Ankle Plantar Flexion: 3+/5  LLE   LLE : Exceptions to WFL  Strength LLE  L Knee Extension: 3/5  L Ankle Dorsiflexion: 3+/5  L Ankle Plantar Flexion: 3+/5  Outcome Measures:  [facility] Basic Mobility  Turning from your back to your side while in a flat bed without using bedrails: A little  Moving from lying on your back to sitting on the side of a flat bed without using bedrails: A little  Moving to and from bed to chair (including a wheelchair): A little  Standing up from a chair using your arms (e.g. wheelchair or bedside chair): A little  To walk in hospital room: A little  Climbing 3-5 steps with railing: A lot  Basic Mobility - Total Score: 17    Encounter Problems       Encounter Problems (Active)       Balance       Goal 1       Start:  03/29/25    Expected End:  04/12/25       Pt performs all sitting balance IND and standing balance mod IND using wheeled walker            Mobility       STG - Patient will ambulate       Start:  03/29/25    Expected End:  04/12/25       >50 ft with supervision using wheeled walker with proper gait mechanics            PT Transfers       STG - Patient to transfer to and  from sit to supine       Start:  03/29/25    Expected End:  04/12/25       Mod IND         STG - Patient will transfer sit to and from stand       Start:  03/29/25    Expected End:  04/12/25       With supervision using wheeled walker and proper body mechanics              Education Documentation  Precautions, taught by Reinaldo Chappell PT at 3/29/2025 11:24 AM.  Learner: Patient  Readiness: Acceptance  Method: Explanation, Demonstration  Response: Demonstrated Understanding    Body Mechanics, taught by Reianldo Chappell PT at 3/29/2025 11:24 AM.  Learner: Patient  Readiness: Acceptance  Method: Explanation, Demonstration  Response: Demonstrated Understanding    Mobility Training, taught by Reinaldo Chappell PT at 3/29/2025 11:24 AM.  Learner: Patient  Readiness: Acceptance  Method: Explanation, Demonstration  Response: Demonstrated Understanding

## 2025-03-29 NOTE — CARE PLAN
Pt remained safe and stable throughout shift. Pt rested comfortably. Telemetry orders have been discontinued. Vital signs to be done going forward once per shift. No acute events overnight.      Problem: Pain - Adult  Goal: Verbalizes/displays adequate comfort level or baseline comfort level  Outcome: Progressing     Problem: Safety - Adult  Goal: Free from fall injury  Outcome: Progressing     Problem: Discharge Planning  Goal: Discharge to home or other facility with appropriate resources  Outcome: Progressing     Problem: Chronic Conditions and Co-morbidities  Goal: Patient's chronic conditions and co-morbidity symptoms are monitored and maintained or improved  Outcome: Progressing     Problem: Nutrition  Goal: Nutrient intake appropriate for maintaining nutritional needs  Outcome: Progressing     Problem: Skin  Goal: Decreased wound size/increased tissue granulation at next dressing change  Outcome: Progressing  Flowsheets (Taken 3/29/2025 0638)  Decreased wound size/increased tissue granulation at next dressing change: Protective dressings over bony prominences  Goal: Participates in plan/prevention/treatment measures  Outcome: Progressing  Flowsheets (Taken 3/29/2025 0638)  Participates in plan/prevention/treatment measures:   Discuss with provider PT/OT consult   Increase activity/out of bed for meals  Goal: Prevent/manage excess moisture  Outcome: Progressing  Flowsheets (Taken 3/29/2025 0638)  Prevent/manage excess moisture: Cleanse incontinence/protect with barrier cream  Goal: Prevent/minimize sheer/friction injuries  Outcome: Progressing  Flowsheets (Taken 3/29/2025 0638)  Prevent/minimize sheer/friction injuries:   Complete micro-shifts as needed if patient unable. Adjust patient position to relieve pressure points, not a full turn   HOB 30 degrees or less   Increase activity/out of bed for meals   Use pull sheet   Turn/reposition every 2 hours/use positioning/transfer devices  Goal: Promote/optimize  nutrition  Outcome: Progressing  Flowsheets (Taken 3/29/2025 0638)  Promote/optimize nutrition: Monitor/record intake including meals  Goal: Promote skin healing  Outcome: Progressing  Flowsheets (Taken 3/29/2025 0638)  Promote skin healing:   Assess skin/pad under line(s)/device(s)   Protective dressings over bony prominences     Problem: Fall/Injury  Goal: Not fall by end of shift  Outcome: Progressing  Goal: Be free from injury by end of the shift  Outcome: Progressing  Goal: Verbalize understanding of personal risk factors for fall in the hospital  Outcome: Progressing  Goal: Verbalize understanding of risk factor reduction measures to prevent injury from fall in the home  Outcome: Progressing  Goal: Use assistive devices by end of the shift  Outcome: Progressing  Goal: Pace activities to prevent fatigue by end of the shift  Outcome: Progressing     Problem: Pain  Goal: Takes deep breaths with improved pain control throughout the shift  Outcome: Progressing  Goal: Turns in bed with improved pain control throughout the shift  Outcome: Progressing  Goal: Walks with improved pain control throughout the shift  Outcome: Progressing  Goal: Performs ADL's with improved pain control throughout shift  Outcome: Progressing  Goal: Participates in PT with improved pain control throughout the shift  Outcome: Progressing  Goal: Free from opioid side effects throughout the shift  Outcome: Progressing  Goal: Free from acute confusion related to pain meds throughout the shift  Outcome: Progressing

## 2025-03-30 ENCOUNTER — HOME HEALTH ADMISSION (OUTPATIENT)
Dept: HOME HEALTH SERVICES | Facility: HOME HEALTH | Age: OVER 89
End: 2025-03-30
Payer: MEDICARE

## 2025-03-30 ENCOUNTER — DOCUMENTATION (OUTPATIENT)
Dept: HOME HEALTH SERVICES | Facility: HOME HEALTH | Age: OVER 89
End: 2025-03-30
Payer: MEDICARE

## 2025-03-30 NOTE — HH CARE COORDINATION
Home Care received a Referral for Physical Therapy and Occupational Therapy. We have processed the referral for a Start of Care on 24-48 HOURS .     If you have any questions or concerns, please feel free to contact us at [phone]. Follow the prompts, enter your five digit zip code, and you will be directed to your care team on [facility].